# Patient Record
Sex: MALE | Race: WHITE | NOT HISPANIC OR LATINO | Employment: FULL TIME | URBAN - NONMETROPOLITAN AREA
[De-identification: names, ages, dates, MRNs, and addresses within clinical notes are randomized per-mention and may not be internally consistent; named-entity substitution may affect disease eponyms.]

---

## 2023-01-11 NOTE — PROGRESS NOTES
"Subjective    Mr. Agarwal is 37 y.o. male    Chief Complaint: Vasectomy Consult    History of Present Illness  37-year-old male new patient requesting vasectomy for permanent sterilization.  He denies scrotal pain. He has no children but states both he and his wife desire no children.        The following portions of the patient's history were reviewed and updated as appropriate: allergies, current medications, past family history, past medical history, past social history, past surgical history and problem list.    Review of Systems      Current Outpatient Medications:   •  finasteride (PROSCAR) 5 MG tablet, Take 5 mg by mouth., Disp: , Rfl:   •  zolpidem CR (AMBIEN CR) 12.5 MG CR tablet, Take 12.5 mg by mouth At Night As Needed., Disp: , Rfl:     History reviewed. No pertinent past medical history.    History reviewed. No pertinent surgical history.    Social History     Socioeconomic History   • Marital status:    Tobacco Use   • Smoking status: Never   • Smokeless tobacco: Never   Vaping Use   • Vaping Use: Never used   Substance and Sexual Activity   • Alcohol use: Never   • Drug use: Never   • Sexual activity: Defer       Family History   Problem Relation Age of Onset   • No Known Problems Father    • No Known Problems Mother        Objective    Temp 97.4 °F (36.3 °C)   Ht 185.4 cm (73\")   Wt 108 kg (237 lb 12.8 oz)   BMI 31.37 kg/m²     Physical Exam  Penis and testicles normal. Vasa palpable bilaterally.       No results found for this or any previous visit.  Assessment and Plan    Diagnoses and all orders for this visit:    1. Encounter for other contraceptive management (Primary)  -     Vasectomy; Future      We spent time today discussing the permanent irreversible elective nature of vasectomy including the risks, benefits, and alternatives.  We discussed risk for infection, bleeding, need for additional procedures, loss of testicle, chronic pain, failure procedure, need to continue back of her " current birth control method until sterility is confirmed.  He voiced understanding and provided informed consent to proceed in the clinic in the next few weeks.       This document has been signed by JARED Fonseca MD on January 14, 2023 13:22 CST

## 2023-01-13 ENCOUNTER — OFFICE VISIT (OUTPATIENT)
Dept: UROLOGY | Facility: CLINIC | Age: 38
End: 2023-01-13
Payer: COMMERCIAL

## 2023-01-13 VITALS — TEMPERATURE: 97.4 F | HEIGHT: 73 IN | BODY MASS INDEX: 31.51 KG/M2 | WEIGHT: 237.8 LBS

## 2023-01-13 DIAGNOSIS — Z30.8 ENCOUNTER FOR OTHER CONTRACEPTIVE MANAGEMENT: Primary | ICD-10-CM

## 2023-01-13 PROCEDURE — 99203 OFFICE O/P NEW LOW 30 MIN: CPT | Performed by: UROLOGY

## 2023-01-13 RX ORDER — ZOLPIDEM TARTRATE 12.5 MG/1
12.5 TABLET, FILM COATED, EXTENDED RELEASE ORAL NIGHTLY PRN
COMMUNITY
Start: 2022-12-21

## 2023-01-13 RX ORDER — FINASTERIDE 5 MG/1
5 TABLET, FILM COATED ORAL
COMMUNITY

## 2023-01-16 ENCOUNTER — TELEPHONE (OUTPATIENT)
Dept: UROLOGY | Facility: CLINIC | Age: 38
End: 2023-01-16
Payer: COMMERCIAL

## 2023-01-16 NOTE — TELEPHONE ENCOUNTER
Pt called to change providers for his vasectomy- Dr. Fonseca did approve, Hub is scheduling with another provider.

## 2023-01-17 NOTE — PROGRESS NOTES
Subjective    Mr. Agarwal is 37 y.o. male    Chief Complaint: Encounter for Vasectomy Counciling    History of Present Illness  Patient here for vasectomy consult.  Patient  with zero children.  No scrotal trauma/surgery.     The following portions of the patient's history were reviewed and updated as appropriate: allergies, current medications, past family history, past medical history, past social history, past surgical history and problem list.    Review of Systems      Current Outpatient Medications:   •  finasteride (PROSCAR) 5 MG tablet, Take 5 mg by mouth., Disp: , Rfl:   •  zolpidem CR (AMBIEN CR) 12.5 MG CR tablet, Take 12.5 mg by mouth At Night As Needed., Disp: , Rfl:   •  ALPRAZolam (Xanax) 2 MG tablet, Take 1 tablet by mouth At Night As Needed for Anxiety. Take 30 minutes prior to procedure, Disp: 1 tablet, Rfl: 0  •  HYDROcodone-acetaminophen (Norco) 5-325 MG per tablet, Take 1 tablet by mouth Every 6 (Six) Hours As Needed for Severe Pain., Disp: 8 tablet, Rfl: 0    History reviewed. No pertinent past medical history.    History reviewed. No pertinent surgical history.    Social History     Socioeconomic History   • Marital status:    Tobacco Use   • Smoking status: Never   • Smokeless tobacco: Never   Vaping Use   • Vaping Use: Never used   Substance and Sexual Activity   • Alcohol use: Never   • Drug use: Never   • Sexual activity: Defer       Family History   Problem Relation Age of Onset   • No Known Problems Father    • No Known Problems Mother        Objective    There were no vitals taken for this visit.    Physical Exam  Genitourinary:     Penis: Normal.       Testes: Normal.      Comments: Vas palpable bilaterally            No results found for this or any previous visit.  Assessment and Plan    Diagnoses and all orders for this visit:    1. Encounter for vasectomy counseling (Primary)  -     HYDROcodone-acetaminophen (Norco) 5-325 MG per tablet; Take 1 tablet by mouth Every 6  (Six) Hours As Needed for Severe Pain.  Dispense: 8 tablet; Refill: 0  -     ALPRAZolam (Xanax) 2 MG tablet; Take 1 tablet by mouth At Night As Needed for Anxiety. Take 30 minutes prior to procedure  Dispense: 1 tablet; Refill: 0          He was given the consent form, pre-vasectomy instruction sheet, and vasectomy booklet. I extensively reviewed with him the likely postoperative recuperative period as well as the need to continue to use contraception until he is notified by us of his sterility. He will have a semen analysis after 20-30 ejaculations. He understands the potential side effects of local anesthesia, bleeding, scrotal hematoma, wound infection, epididymal orchitis, epididymal congestion,  1% risk chronic testicular pain potentially requiring further surgery, sperm granuloma, antisperm antibodies, early recanalization, spontaneous recanalization with pregnancy after demonstration of azoospermia risk of 1 in 2000 and the possible association with prostate cancer. He is aware of alternatives to vasectomy. He has given this careful consideration and wishes to proceed with a vasectomy.

## 2023-01-18 ENCOUNTER — TELEPHONE (OUTPATIENT)
Dept: UROLOGY | Facility: CLINIC | Age: 38
End: 2023-01-18
Payer: COMMERCIAL

## 2023-01-18 NOTE — TELEPHONE ENCOUNTER
Caller: [facility]        Best call back number: [phone]    What form or medical record are you requesting: PROG NOTE    Who is requesting this form or medical record from you: REF FACILITY    How would you like to receive the form or medical records (pick-up, mail, fax): FAX  If fax, what is the fax number: [phone]    Timeframe paperwork needed: ASAP    Additional notes: REF [facility] REQUIRES PROG NOTE FROM 1/13/23 VISIT WITH DR THORPE PLEASE

## 2023-01-23 ENCOUNTER — OFFICE VISIT (OUTPATIENT)
Dept: UROLOGY | Facility: CLINIC | Age: 38
End: 2023-01-23
Payer: COMMERCIAL

## 2023-01-23 DIAGNOSIS — Z30.09 ENCOUNTER FOR VASECTOMY COUNSELING: Primary | ICD-10-CM

## 2023-01-23 PROCEDURE — 99213 OFFICE O/P EST LOW 20 MIN: CPT | Performed by: UROLOGY

## 2023-01-23 RX ORDER — HYDROCODONE BITARTRATE AND ACETAMINOPHEN 5; 325 MG/1; MG/1
1 TABLET ORAL EVERY 6 HOURS PRN
Qty: 8 TABLET | Refills: 0 | Status: SHIPPED | OUTPATIENT
Start: 2023-01-23

## 2023-01-23 RX ORDER — ALPRAZOLAM 2 MG/1
2 TABLET ORAL NIGHTLY PRN
Qty: 1 TABLET | Refills: 0 | Status: SHIPPED | OUTPATIENT
Start: 2023-01-23

## 2023-03-31 ENCOUNTER — TELEPHONE (OUTPATIENT)
Dept: UROLOGY | Facility: CLINIC | Age: 38
End: 2023-03-31
Payer: COMMERCIAL

## 2023-03-31 DIAGNOSIS — Z30.09 ENCOUNTER FOR VASECTOMY COUNSELING: Primary | ICD-10-CM

## 2023-03-31 RX ORDER — HYDROCODONE BITARTRATE AND ACETAMINOPHEN 5; 325 MG/1; MG/1
1 TABLET ORAL EVERY 6 HOURS PRN
Qty: 8 TABLET | Refills: 0 | Status: SHIPPED | OUTPATIENT
Start: 2023-03-31

## 2023-03-31 NOTE — TELEPHONE ENCOUNTER
Called pharmacy and spoke with them about the pain meds.  They are filling them for the pt and I called and let the pt knwo.

## 2023-04-14 ENCOUNTER — PROCEDURE VISIT (OUTPATIENT)
Dept: UROLOGY | Facility: CLINIC | Age: 38
End: 2023-04-14
Payer: COMMERCIAL

## 2023-04-14 DIAGNOSIS — Z30.2 ENCOUNTER FOR VASECTOMY: ICD-10-CM

## 2023-04-14 PROCEDURE — 55250 REMOVAL OF SPERM DUCT(S): CPT | Performed by: UROLOGY

## 2023-04-14 NOTE — PROGRESS NOTES
No Scalpel Vasectomy Procedure Note    Indications: 37 y.o. male desiring permanent sterilization    Pre-operative Diagnosis: Undesired fertility    Post-operative Diagnosis: Undesired fertility    Anesthesia: Lidocaine 1% without epinephrine     Procedure Details     The risks and benefits of the procedure were discussed at the pre-procedure consultation, and written, informed consent obtained.    Premedicated with Norco 5 and Xanax 2 mg 30 minutes prior to procedure.    The scrotum was palpated with both testes normal in size and position, no masses palpated. The scrotum was cleansed with warm Betadine and draped in the usual sterile manner.     A vasal sheath block was performed on both the left and right vas.  After adequate anesthesia was established, a small perforation was made in the skin and the right vas was isolated with the ring forceps, dissected free and delivered through the skin perforation.  The right vas was divided, approximately 3 cm portion removed, and each end of the vas was cauterized.  The ends of the vas were replaced in the scrotum through the puncture site.  The left vas was then isolated, divided, cauterized in a similar fashion.  Midportions removed not sent to pathology to confirm because they were grossly normal.     Any bleeding was controlled with electrocautery.  3.0 Chormic interrupted suture was used to close both sites. The puncture site was dry when the procedure was completed. Dressing was applied to both incisions and jock strap placed for scrotal support.    Specimen: None    Condition: Stable    Complications: None    Plan:  1. Continue contraception until negative sperm analysis. Bring 2 semen samples after 20-30 ejaculates  2. Warning signs of infection were reviewed.   3. Patient is taken home by significant other with written home care instructions.  • Bedrest X 48 hrs, Ice pack every 3 hours for 24 hrs.    • Call the clinic if excessive pain, bleeding or swelling.

## 2023-06-16 ENCOUNTER — TELEPHONE (OUTPATIENT)
Dept: UROLOGY | Facility: CLINIC | Age: 38
End: 2023-06-16

## 2023-06-16 NOTE — TELEPHONE ENCOUNTER
Provider: DR MONTEJO  Caller: STAR WERNER  Relationship to Patient: SELF  Reason for Call: PT WOULD LIKE TO GET PSA TESTED DUE TO FAMILY HISTORY.  FATHER HAS HAD PROSTATE CANCER, AND BROTHER RECENTLY TESTED FOR HIGH PSA.    PLEASE CALL PT TO DISCUSS AND PLACE ORDERS AS NEEDED.

## 2023-07-24 ENCOUNTER — OFFICE VISIT (OUTPATIENT)
Dept: PRIMARY CARE CLINIC | Age: 38
End: 2023-07-24
Payer: COMMERCIAL

## 2023-07-24 VITALS
BODY MASS INDEX: 31.46 KG/M2 | DIASTOLIC BLOOD PRESSURE: 84 MMHG | OXYGEN SATURATION: 98 % | WEIGHT: 237.4 LBS | TEMPERATURE: 97.5 F | HEART RATE: 94 BPM | HEIGHT: 73 IN | SYSTOLIC BLOOD PRESSURE: 124 MMHG

## 2023-07-24 DIAGNOSIS — Z01.89 ROUTINE LAB DRAW: ICD-10-CM

## 2023-07-24 DIAGNOSIS — R07.89 ATYPICAL CHEST PAIN: Primary | ICD-10-CM

## 2023-07-24 DIAGNOSIS — K21.9 GASTROESOPHAGEAL REFLUX DISEASE WITHOUT ESOPHAGITIS: ICD-10-CM

## 2023-07-24 DIAGNOSIS — Z12.5 SCREENING FOR PROSTATE CANCER: ICD-10-CM

## 2023-07-24 DIAGNOSIS — Z80.42 FAMILY HX OF PROSTATE CANCER: ICD-10-CM

## 2023-07-24 LAB
ALBUMIN SERPL-MCNC: 5.4 G/DL (ref 3.5–5.2)
ALP SERPL-CCNC: 78 U/L (ref 40–130)
ALT SERPL-CCNC: 33 U/L (ref 5–41)
ANION GAP SERPL CALCULATED.3IONS-SCNC: 13 MMOL/L (ref 7–19)
AST SERPL-CCNC: 33 U/L (ref 5–40)
BASOPHILS # BLD: 0.1 K/UL (ref 0–0.2)
BASOPHILS NFR BLD: 1 % (ref 0–1)
BILIRUB SERPL-MCNC: 1.7 MG/DL (ref 0.2–1.2)
BUN SERPL-MCNC: 12 MG/DL (ref 6–20)
CALCIUM SERPL-MCNC: 9.9 MG/DL (ref 8.6–10)
CHLORIDE SERPL-SCNC: 103 MMOL/L (ref 98–111)
CHOLEST SERPL-MCNC: 174 MG/DL (ref 160–199)
CO2 SERPL-SCNC: 26 MMOL/L (ref 22–29)
CREAT SERPL-MCNC: 1.1 MG/DL (ref 0.5–1.2)
EOSINOPHIL # BLD: 0.2 K/UL (ref 0–0.6)
EOSINOPHIL NFR BLD: 3.5 % (ref 0–5)
ERYTHROCYTE [DISTWIDTH] IN BLOOD BY AUTOMATED COUNT: 12.6 % (ref 11.5–14.5)
GLUCOSE SERPL-MCNC: 90 MG/DL (ref 74–109)
HCT VFR BLD AUTO: 47.8 % (ref 42–52)
HDLC SERPL-MCNC: 43 MG/DL (ref 55–121)
HGB BLD-MCNC: 16.7 G/DL (ref 14–18)
IMM GRANULOCYTES # BLD: 0.1 K/UL
LDLC SERPL CALC-MCNC: 105 MG/DL
LYMPHOCYTES # BLD: 1.6 K/UL (ref 1.1–4.5)
LYMPHOCYTES NFR BLD: 32 % (ref 20–40)
MCH RBC QN AUTO: 30.1 PG (ref 27–31)
MCHC RBC AUTO-ENTMCNC: 34.9 G/DL (ref 33–37)
MCV RBC AUTO: 86.3 FL (ref 80–94)
MONOCYTES # BLD: 0.4 K/UL (ref 0–0.9)
MONOCYTES NFR BLD: 8.4 % (ref 0–10)
NEUTROPHILS # BLD: 2.7 K/UL (ref 1.5–7.5)
NEUTS SEG NFR BLD: 54.1 % (ref 50–65)
PLATELET # BLD AUTO: 214 K/UL (ref 130–400)
PMV BLD AUTO: 10.8 FL (ref 9.4–12.4)
POTASSIUM SERPL-SCNC: 3.9 MMOL/L (ref 3.5–5)
PROT SERPL-MCNC: 7.5 G/DL (ref 6.6–8.7)
PSA SERPL-MCNC: 0.76 NG/ML (ref 0–4)
RBC # BLD AUTO: 5.54 M/UL (ref 4.7–6.1)
SODIUM SERPL-SCNC: 142 MMOL/L (ref 136–145)
TRIGL SERPL-MCNC: 132 MG/DL (ref 0–149)
WBC # BLD AUTO: 4.9 K/UL (ref 4.8–10.8)

## 2023-07-24 PROCEDURE — 99213 OFFICE O/P EST LOW 20 MIN: CPT | Performed by: FAMILY MEDICINE

## 2023-07-24 NOTE — PROGRESS NOTES
[facility]  [facility],[address]  [address] [address]  Dept: [phone]  Dept Fax: [phone]  Loc: [phone]      Subjective:     Chief Complaint   Patient presents with    Chest Pain       HPI:  Barbara Zamora is a 41 y.o. male presents today for  evaluation of atypical chest pain  that came on after eating fast food at [employer]. CP was located in the lower sternal area. Pain is non radiating. Pain lasted 2 hours and relieved after taking Peptobismol  and an OTC antacid as well as after burping. No prior episode of this kind of chest pain before. He also states that he has been working out quite a lot (bench press)  Pt is concerned because of + family hx of heart disease  BP is stable. Most recent lipid panel is normal.he is not a smoker. Weight is normal. He is active. ROS:   Review of Systems   Cardiovascular:  Positive for chest pain. All other systems reviewed and are negative. Chest discomfort as described in HPI      PMHx:  Past Medical History:   Diagnosis Date    Anxiety     patient takes clonazepam as needed     There is no problem list on file for this patient. PSHx:  Past Surgical History:   Procedure Laterality Date    EAR SURGERY Bilateral 1996    had ears pinned back    TONSILLECTOMY  2008    WISDOM TOOTH EXTRACTION  1998       PFHx:  Family History   Problem Relation Age of Onset    No Known Problems Mother     Prostate Cancer Father        SocialHx:  Social History     Tobacco Use    Smoking status: Never    Smokeless tobacco: Never   Substance Use Topics    Alcohol use:  Yes     Alcohol/week: 3.0 standard drinks     Types: 3 Cans of beer per week       Allergies:  No Known Allergies    Medications:  Current Outpatient Medications   Medication Sig Dispense Refill    fluticasone (FLONASE) 50 MCG/ACT nasal spray 1 spray by Each Nostril route daily 16 g 0    zolpidem (AMBIEN CR) 12.5 MG extended release tablet Take 1 tablet by

## 2023-08-03 ENCOUNTER — TELEPHONE (OUTPATIENT)
Dept: PRIMARY CARE CLINIC | Age: 38
End: 2023-08-03

## 2023-08-03 NOTE — TELEPHONE ENCOUNTER
----- Message from Karla Valdez MD sent at 8/1/2023  5:12 PM CDT -----  Pt has appt in 2 weeks - will discuss results with him then

## 2023-08-03 NOTE — TELEPHONE ENCOUNTER
Patient has scheduled appointment for 08- and provider will discuss results with patient during appointment.

## 2023-08-04 DIAGNOSIS — G47.00 INSOMNIA, UNSPECIFIED TYPE: Primary | ICD-10-CM

## 2023-08-07 RX ORDER — ZOLPIDEM TARTRATE 12.5 MG/1
12.5 TABLET, FILM COATED, EXTENDED RELEASE ORAL NIGHTLY PRN
Qty: 30 TABLET | Refills: 0 | Status: SHIPPED | OUTPATIENT
Start: 2023-08-07 | End: 2023-09-06

## 2023-08-15 ENCOUNTER — OFFICE VISIT (OUTPATIENT)
Dept: PRIMARY CARE CLINIC | Age: 38
End: 2023-08-15
Payer: COMMERCIAL

## 2023-08-15 VITALS
HEIGHT: 73 IN | WEIGHT: 239 LBS | TEMPERATURE: 99 F | SYSTOLIC BLOOD PRESSURE: 124 MMHG | HEART RATE: 93 BPM | BODY MASS INDEX: 31.68 KG/M2 | DIASTOLIC BLOOD PRESSURE: 72 MMHG | OXYGEN SATURATION: 97 %

## 2023-08-15 DIAGNOSIS — J02.9 SORE THROAT: ICD-10-CM

## 2023-08-15 DIAGNOSIS — Z00.00 ENCOUNTER FOR ROUTINE HISTORY AND PHYSICAL EXAMINATION OF ADULT: Primary | ICD-10-CM

## 2023-08-15 DIAGNOSIS — J00 ACUTE NASOPHARYNGITIS: ICD-10-CM

## 2023-08-15 PROCEDURE — 99395 PREV VISIT EST AGE 18-39: CPT | Performed by: FAMILY MEDICINE

## 2023-08-15 RX ORDER — AZITHROMYCIN 250 MG/1
TABLET, FILM COATED ORAL
Qty: 6 TABLET | Refills: 0 | Status: SHIPPED | OUTPATIENT
Start: 2023-08-15

## 2023-08-15 ASSESSMENT — ENCOUNTER SYMPTOMS
FACIAL SWELLING: 0
SINUS PAIN: 0
ALLERGIC/IMMUNOLOGIC NEGATIVE: 1
GASTROINTESTINAL NEGATIVE: 1
SINUS PRESSURE: 0
SORE THROAT: 1
RESPIRATORY NEGATIVE: 1

## 2023-08-15 NOTE — PROGRESS NOTES
Well Adult Note  Name: Devaughn Sep Date: 8/15/2023   MRN: [id number] Sex: Male   Age: 41 y.o. Ethnicity: Non- / Non    : 1985 Race: White (non-)      Oralia Hashimoto is here for well adult exam.  History:  He presents today for his annual physical and routine preventative visit. PMHx reviewed. No significant change. Family and social history also reviewed. No recent ER or UC visit. No recent hospitalization. Chronic medications reviewed, updated and reconciled  Pt does not smoke, drink ETOH very rarely. He does not use recreational drugs. He is up to date on his immunizations. He is due for screening preventative labs   Today he c/o sore throat. He states that when he woke up this am, his voice was really deep. No cough. No fever. No known ill contacts    Review of Systems   HENT:  Positive for congestion (mild), postnasal drip and sore throat. Negative for ear pain, facial swelling, hearing loss, sinus pressure, sinus pain and sneezing. Respiratory: Negative. Gastrointestinal: Negative. Genitourinary: Negative. Musculoskeletal: Negative. Skin: Negative. Allergic/Immunologic: Negative. Neurological: Negative. Hematological: Negative. Psychiatric/Behavioral: Negative. All other systems reviewed and are negative. No Known Allergies      Prior to Visit Medications    Medication Sig Taking? Authorizing Provider   azithromycin (ZITHROMAX) 250 MG tablet Take 2 tablet today and then 1 tablet po daily x 4 days Yes Olivia Jones MD   zolpidem (AMBIEN CR) 12.5 MG extended release tablet Take 1 tablet by mouth nightly as needed for Sleep for up to 30 days. Max Daily Amount: 12.5 mg Yes Olivia Jones MD   fluticasone (FLONASE) 50 MCG/ACT nasal spray 1 spray by Each Nostril route daily Yes Olivia Jones MD   clonazePAM (KLONOPIN) 2 MG tablet Take 1 tablet by mouth as needed for Anxiety for up to 30 days.  Yes Asmita Amezcua MD

## 2023-08-15 NOTE — PATIENT INSTRUCTIONS
better and can speak for yourself again, you can accept or refuse any treatment. It doesn't matter what you said in your living will. Some states may limit your right to refuse treatment in certain cases. For example, you may need to clearly state in your living will that you don't want artificial hydration and nutrition, such as being fed through a tube. Is a living will a legal document? A living will is a legal document. Each state has its own laws about living michael. And a living will may be called something else in your state. Here are some things to know about living michael. You don't need an  to complete a living will. But legal advice can be helpful if your state's laws are unclear. It can also help if your health history is complicated or your family can't agree on what should be in your living will. You can change your living will at any time. Some people find that their wishes about end-of-life care change as their health changes. If you make big changes to your living will, complete a new form. If you move to another state, make sure that your living will is legal in the state where you now live. In most cases, doctors will respect your wishes even if you have a form from a different state. You might use a universal form that has been approved by many states. This kind of form can sometimes be filled out and stored online. Your digital copy will then be available wherever you have a connection to the internet. The doctors and nurses who need to treat you can find it right away. Your state may offer an online registry. This is another place where you can store your living will online. It's a good idea to get your living will notarized. This means using a person called a  to watch two people sign, or witness, your living will. What should you know when you create a living will? Here are some questions to ask yourself as you make your living will.   Do you know enough about

## 2023-08-17 ENCOUNTER — TELEPHONE (OUTPATIENT)
Dept: PRIMARY CARE CLINIC | Age: 38
End: 2023-08-17

## 2023-08-17 NOTE — TELEPHONE ENCOUNTER
Ariana Robin called wanted to let Dr. Mi Gonzalez and staff know that he was in the office on 8/15/23 with a sore throat and he has tested positive for COVID. He is asking if he can be prescribed Paxlovid?

## 2023-08-17 NOTE — TELEPHONE ENCOUNTER
Paxlovid  is only indicate  in moderate to severe cases of Covid.    He does not meet criteria for the medication

## 2023-09-12 DIAGNOSIS — G47.00 INSOMNIA, UNSPECIFIED TYPE: ICD-10-CM

## 2023-09-12 RX ORDER — ZOLPIDEM TARTRATE 12.5 MG/1
TABLET, FILM COATED, EXTENDED RELEASE ORAL
Qty: 30 TABLET | Refills: 0 | Status: SHIPPED | OUTPATIENT
Start: 2023-09-12 | End: 2023-10-12

## 2023-10-16 DIAGNOSIS — G47.00 INSOMNIA, UNSPECIFIED TYPE: ICD-10-CM

## 2023-10-16 RX ORDER — ZOLPIDEM TARTRATE 12.5 MG/1
TABLET, FILM COATED, EXTENDED RELEASE ORAL
Qty: 30 TABLET | Refills: 0 | Status: SHIPPED | OUTPATIENT
Start: 2023-10-16 | End: 2023-11-15

## 2023-10-16 NOTE — TELEPHONE ENCOUNTER
Alana Lopez called to request a refill on his medication. Last office visit : 8/15/2023   Next office visit : 2/15/2024     Last UDS:   Amphetamine Screen, Urine   Date Value Ref Range Status   02/23/2023 Negative  Final     Barbiturate Screen, Urine   Date Value Ref Range Status   02/23/2023 Negative  Final     Benzodiazepine Screen, Urine   Date Value Ref Range Status   02/23/2023 Negative  Final     Buprenorphine Urine   Date Value Ref Range Status   02/23/2023 Negative  Final     Cocaine Metabolite Screen, Urine   Date Value Ref Range Status   02/23/2023 Negative  Final     Gabapentin Screen, Urine   Date Value Ref Range Status   02/23/2023 Negative  Final     MDMA, Urine   Date Value Ref Range Status   02/23/2023 Negative  Final     Methamphetamine, Urine   Date Value Ref Range Status   02/23/2023 Negative  Final     Opiate Scrn, Ur   Date Value Ref Range Status   02/23/2023 Negative  Final     Oxycodone Screen, Ur   Date Value Ref Range Status   02/23/2023 Negative  Final     PCP Screen, Urine   Date Value Ref Range Status   02/23/2023 Negative  Final     Propoxyphene Screen, Urine   Date Value Ref Range Status   02/23/2023 Negative  Final     THC Screen, Urine   Date Value Ref Range Status   02/23/2023 Negative  Final     Tricyclic Antidepressants, Urine   Date Value Ref Range Status   02/23/2023 Negative  Final       Last Eudelia Harp: 8/7/23  Medication Contract: 2/23/23   Last Fill: 9/12/23    Requested Prescriptions     Pending Prescriptions Disp Refills    zolpidem (AMBIEN CR) 12.5 MG extended release tablet [Pharmacy Med Name: Zolpidem Tartrate ER 12.5 MG Oral Tablet Extended Release] 30 tablet 0     Sig: TAKE 1 TABLET BY MOUTH ONCE DAILY AT NIGHT AS NEEDED FOR SLEEP         Please approve or refuse this medication.    Jordan Hightower LPN

## 2023-11-03 ENCOUNTER — OFFICE VISIT (OUTPATIENT)
Dept: PRIMARY CARE CLINIC | Age: 38
End: 2023-11-03
Payer: COMMERCIAL

## 2023-11-03 VITALS
BODY MASS INDEX: 31.54 KG/M2 | HEART RATE: 73 BPM | WEIGHT: 238 LBS | HEIGHT: 73 IN | SYSTOLIC BLOOD PRESSURE: 120 MMHG | TEMPERATURE: 97.6 F | DIASTOLIC BLOOD PRESSURE: 75 MMHG

## 2023-11-03 DIAGNOSIS — J02.9 SORE THROAT: Primary | ICD-10-CM

## 2023-11-03 LAB — S PYO AG THROAT QL: NORMAL

## 2023-11-03 PROCEDURE — 99213 OFFICE O/P EST LOW 20 MIN: CPT | Performed by: NURSE PRACTITIONER

## 2023-11-03 ASSESSMENT — ENCOUNTER SYMPTOMS
VOMITING: 0
COLOR CHANGE: 0
DIARRHEA: 0
COUGH: 1
SHORTNESS OF BREATH: 0
SORE THROAT: 1
NAUSEA: 0
CHEST TIGHTNESS: 0
ABDOMINAL PAIN: 0

## 2023-11-03 NOTE — PROGRESS NOTES
11/3/2023     Deanna Nagel (:  1985) is a 41 y.o. male,Established patient, here for evaluation of the following chief complaint(s):  Cough (Sore and itchy throat. Pt tends to get strep often. Warm tea and salt water gargle helps. )      ASSESSMENT/PLAN:  1. Sore throat  Assessment & Plan:   Patient here today with concerns of a 'scratchy\" sore throat that began yesterday. He denies any fever, but does note occasional cough. His wife tested positive for strep earlier this week. His throat is slightly erythematous without any noted edema. In office strep was negative. Encouraged increased hydration and rest, along with over the counter symptomatic treatment. Patient to call back or return to clinic with any worsening symptoms or if not improved. Orders:  -     POCT rapid strep A      Return if symptoms worsen or fail to improve. SUBJECTIVE/OBJECTIVE:  Cough  Associated symptoms include coughing and a sore throat. Pertinent negatives include no abdominal pain, arthralgias, chest pain, congestion, fever, myalgias, nausea, numbness, vomiting or weakness. Prior to Visit Medications    Medication Sig Taking? Authorizing Provider   zolpidem (AMBIEN CR) 12.5 MG extended release tablet TAKE 1 TABLET BY MOUTH ONCE DAILY AT NIGHT AS NEEDED FOR SLEEP Yes KATHIE Hills CNP   fluticasone (FLONASE) 50 MCG/ACT nasal spray 1 spray by Each Nostril route daily Yes Melissa Ding MD   finasteride (PROSCAR) 5 MG tablet Take 1 tablet by mouth daily Yes Provider, MD Allison   azithromycin (ZITHROMAX) 250 MG tablet Take 2 tablet today and then 1 tablet po daily x 4 days  Patient not taking: Reported on 11/3/2023  Melissa Ding MD   clonazePAM (KLONOPIN) 2 MG tablet Take 1 tablet by mouth as needed for Anxiety for up to 30 days. Joshua Adam MD       Review of Systems   Constitutional:  Negative for activity change and fever. HENT:  Positive for sore throat.  Negative for congestion ronak connelly

## 2023-11-03 NOTE — ASSESSMENT & PLAN NOTE
Patient here today with concerns of a 'scratchy\" sore throat that began yesterday. He denies any fever, but does note occasional cough. His wife tested positive for strep earlier this week. His throat is slightly erythematous without any noted edema. In office strep was negative. Encouraged increased hydration and rest, along with over the counter symptomatic treatment. Patient to call back or return to clinic with any worsening symptoms or if not improved.
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
## 2023-11-16 ENCOUNTER — ANESTHESIA EVENT (OUTPATIENT)
Dept: OPERATING ROOM | Age: 38
End: 2023-11-16

## 2023-11-20 ENCOUNTER — HOSPITAL ENCOUNTER (OUTPATIENT)
Age: 38
Setting detail: OUTPATIENT SURGERY
Discharge: HOME | End: 2023-11-20
Attending: INTERNAL MEDICINE | Admitting: INTERNAL MEDICINE

## 2023-11-20 ENCOUNTER — APPOINTMENT (OUTPATIENT)
Dept: OPERATING ROOM | Age: 38
End: 2023-11-20
Attending: INTERNAL MEDICINE

## 2023-11-20 ENCOUNTER — ANESTHESIA (OUTPATIENT)
Dept: OPERATING ROOM | Age: 38
End: 2023-11-20

## 2023-11-20 ENCOUNTER — HOSPITAL ENCOUNTER (OUTPATIENT)
Age: 38
Setting detail: SPECIMEN
Discharge: HOME | End: 2023-11-20
Payer: COMMERCIAL

## 2023-11-20 VITALS
WEIGHT: 235 LBS | SYSTOLIC BLOOD PRESSURE: 114 MMHG | HEIGHT: 73 IN | HEART RATE: 71 BPM | BODY MASS INDEX: 31.14 KG/M2 | RESPIRATION RATE: 16 BRPM | TEMPERATURE: 97.2 F | DIASTOLIC BLOOD PRESSURE: 67 MMHG | OXYGEN SATURATION: 96 %

## 2023-11-20 DIAGNOSIS — Z80.9 FAMILY HISTORY OF CANCER: Primary | ICD-10-CM

## 2023-11-20 PROCEDURE — 45385 COLONOSCOPY W/LESION REMOVAL: CPT

## 2023-11-20 PROCEDURE — 88305 TISSUE EXAM BY PATHOLOGIST: CPT

## 2023-11-20 RX ORDER — PROPOFOL 10 MG/ML
INJECTION, EMULSION INTRAVENOUS PRN
Status: DISCONTINUED | OUTPATIENT
Start: 2023-11-20 | End: 2023-11-20 | Stop reason: SDUPTHER

## 2023-11-20 RX ORDER — LIDOCAINE HYDROCHLORIDE 10 MG/ML
INJECTION, SOLUTION EPIDURAL; INFILTRATION; INTRACAUDAL; PERINEURAL PRN
Status: DISCONTINUED | OUTPATIENT
Start: 2023-11-20 | End: 2023-11-20 | Stop reason: SDUPTHER

## 2023-11-20 RX ORDER — SODIUM CHLORIDE, SODIUM LACTATE, POTASSIUM CHLORIDE, CALCIUM CHLORIDE 600; 310; 30; 20 MG/100ML; MG/100ML; MG/100ML; MG/100ML
INJECTION, SOLUTION INTRAVENOUS CONTINUOUS
Status: DISCONTINUED | OUTPATIENT
Start: 2023-11-20 | End: 2023-11-20 | Stop reason: HOSPADM

## 2023-11-20 RX ADMIN — SODIUM CHLORIDE, SODIUM LACTATE, POTASSIUM CHLORIDE, CALCIUM CHLORIDE: 600; 310; 30; 20 INJECTION, SOLUTION INTRAVENOUS at 10:31

## 2023-11-20 RX ADMIN — LIDOCAINE HYDROCHLORIDE 30 MG: 10 INJECTION, SOLUTION EPIDURAL; INFILTRATION; INTRACAUDAL; PERINEURAL at 11:57

## 2023-11-20 RX ADMIN — PROPOFOL 300 MG: 10 INJECTION, EMULSION INTRAVENOUS at 11:57

## 2023-11-20 ASSESSMENT — PAIN - FUNCTIONAL ASSESSMENT: PAIN_FUNCTIONAL_ASSESSMENT: NONE - DENIES PAIN

## 2023-11-20 NOTE — H&P
Patient Name: Emily Erickson  : 1985  MRN: [id number]  DATE: 23    Allergies: No Known Allergies     ENDOSCOPY  History and Physical    Procedure:    [] Diagnostic Colonoscopy       [x] Screening Colonoscopy  [] EGD      [] ERCP      [] EUS       [] Other    [x] Previous office notes/History and Physical reviewed from the patients chart. Please see EMR for further details of HPI. I have examined the patient's status immediately prior to the procedure and:      Indications/HPI:       [x] Screening              [] History of Polyps      []Fhx of colon CA/polyps []+Cologard/DNA/Stool Testing      Anesthesia:   [x] MAC [] Moderate Sedation   [] General   [] None     ROS: 12 pt review of systems was negative unless stated above    Medications:   Prior to Admission medications    Medication Sig Start Date End Date Taking? Authorizing Provider   fluticasone (FLONASE) 50 MCG/ACT nasal spray 1 spray by Each Nostril route daily  Patient not taking: Reported on 2023   Melissa Ding MD   clonazePAM (KLONOPIN) 2 MG tablet Take 1 tablet by mouth as needed for Anxiety for up to 30 days. 3/29/23 8/15/23  Royal De Paz MD   finasteride (PROSCAR) 5 MG tablet Take 1 tablet by mouth daily    Provider, MD Allison       Past Medical History:  Past Medical History:   Diagnosis Date    Anxiety     patient takes clonazepam as needed       Past Surgical History:  Past Surgical History:   Procedure Laterality Date    EAR SURGERY Bilateral     had ears pinned back    TONSILLECTOMY      [facility]         Social History:  Social History     Tobacco Use    Smoking status: Never    Smokeless tobacco: Never   Vaping Use    Vaping Use: Never used   Substance Use Topics    Alcohol use:  Yes     Alcohol/week: 3.0 standard drinks of alcohol     Types: 3 Cans of beer per week    Drug use: Never       Vital Signs:   Vitals:    23 1024   BP: 122/80   Pulse: 76   Resp: 14   Temp:

## 2023-11-20 NOTE — OP NOTE
Patient: Jamila Horowitz : 1985  Med Rec#: [id number] Acc#: [id number]   Primary Care Provider Dank Herrera MD    Date of Procedure:  2023    Endoscopist: Blanca Chavira MD    Referring Provider: Dank Herrera MD    Operation Performed: Colonoscopy with snare polypectomy    Indications: Screening    Anesthesia:  Sedation was administered by anesthesia who monitored the patient during the procedure. I met with Jamila Horowitz prior to procedure. We discussed the procedure itself, and I have discussed the risks of endoscopy (including-- but not limited to-- pain, discomfort, bleeding potentially requiring second endoscopic procedure and/or blood transfusion, organ perforation requiring operative repair, damage to organs near the colon, infection, aspiration, cardiopulmonary/allergic reaction), benefits, indications to endoscopy. Additionally, we discussed options other than colonoscopy. The patient expressed understanding. All questions answered. The patient decided to proceed with the procedure. Signed informed consent was placed on the chart. Blood Loss: minimal    Withdrawal time: > 6 min  Bowel Prep: adequate     Complications: no immediate complications    DESCRIPTION OF PROCEDURE:     A time out was performed. After written informed consent was obtained, the patient was placed in the left lateral position. The perianal area was inspected, and a digital rectal exam was performed. A rectal exam was performed: normal tone, no palpable lesions. At this point, a forward viewing Olympus colonoscope was inserted into the anus and carefully advanced to the cecum. The cecum was identified by the ileocecal valve and the appendiceal orifice. The colonoscope was then slowly withdrawn with careful inspection of the mucosa in a linear and circumferential fashion. The scope was retroflexed in the rectum.  Suction was utilized during the procedure to remove as much air as possible from the

## 2023-11-20 NOTE — DISCHARGE INSTRUCTIONS
Recommendations:  1. Repeat colonoscopy: pending pathology - 5 years  2. Await biopsy results    POST-OP ORDERS: ENDOSCOPY & COLONOSCOPY:    1. Rest today. 2. DO NOT eat or drink until wide awake; eat your usual diet today in moderate amount only. 3. DO NOT drive today. 4. Call physician if you have severe pain, vomiting, fever, rectal bleeding or black bowel movements. 5.  If a biopsy was taken or a polyp removed, you should expect to hear results in about 21 days. If you have heard nothing from your physician by then, call the office for results. 6.  Discharge home when patient awake, vitals signs stable and tolerating liquids. 7. Call with questions or concerns [phone].

## 2023-11-20 NOTE — ANESTHESIA POSTPROCEDURE EVALUATION
Department of Anesthesiology  Postprocedure Note    Patient: Erica Velez  MRN: [id number]  YOB: 1985  Date of evaluation: 11/20/2023      Procedure Summary     Date: 11/20/23 Room / Location: [facility] 02 / [address]    Anesthesia Start: 6259 Anesthesia Stop:     Procedure: [address], NOT HIGH RISK (Abdomen) Diagnosis:       Screen for colon cancer      (Screen for colon cancer [Z12.11])    Surgeons: Ale Justin MD Responsible Provider: KATHIE Bray CRNA    Anesthesia Type: general, TIVA ASA Status: 1          Anesthesia Type: No value filed.     Adalid Phase I: Adalid Score: 10    Adalid Phase II:        Anesthesia Post Evaluation    Patient location during evaluation: bedside  Patient participation: complete - patient participated  Level of consciousness: sleepy but conscious  Pain score: 0  Airway patency: patent  Nausea & Vomiting: no nausea and no vomiting  Complications: no  Cardiovascular status: blood pressure returned to baseline  Respiratory status: acceptable, room air and spontaneous ventilation  Hydration status: euvolemic  Pain management: adequate

## 2023-11-22 DIAGNOSIS — G47.00 INSOMNIA, UNSPECIFIED TYPE: ICD-10-CM

## 2023-11-22 RX ORDER — ZOLPIDEM TARTRATE 12.5 MG/1
TABLET, FILM COATED, EXTENDED RELEASE ORAL
Qty: 30 TABLET | Refills: 0 | Status: SHIPPED | OUTPATIENT
Start: 2023-11-22 | End: 2023-12-22

## 2023-11-22 NOTE — TELEPHONE ENCOUNTER
Alana Lopez called to request a refill on his medication. Last office visit : 11/3/2023   Next office visit : 2/15/2024     Last UDS:   Amphetamine Screen, Urine   Date Value Ref Range Status   02/23/2023 Negative  Final     Barbiturate Screen, Urine   Date Value Ref Range Status   02/23/2023 Negative  Final     Benzodiazepine Screen, Urine   Date Value Ref Range Status   02/23/2023 Negative  Final     Buprenorphine Urine   Date Value Ref Range Status   02/23/2023 Negative  Final     Cocaine Metabolite Screen, Urine   Date Value Ref Range Status   02/23/2023 Negative  Final     Gabapentin Screen, Urine   Date Value Ref Range Status   02/23/2023 Negative  Final     MDMA, Urine   Date Value Ref Range Status   02/23/2023 Negative  Final     Methamphetamine, Urine   Date Value Ref Range Status   02/23/2023 Negative  Final     Opiate Scrn, Ur   Date Value Ref Range Status   02/23/2023 Negative  Final     Oxycodone Screen, Ur   Date Value Ref Range Status   02/23/2023 Negative  Final     PCP Screen, Urine   Date Value Ref Range Status   02/23/2023 Negative  Final     Propoxyphene Screen, Urine   Date Value Ref Range Status   02/23/2023 Negative  Final     THC Screen, Urine   Date Value Ref Range Status   02/23/2023 Negative  Final     Tricyclic Antidepressants, Urine   Date Value Ref Range Status   02/23/2023 Negative  Final       Last Eudelia Harp: 11/22/23  Medication Contract: 2/23/23   Last Fill: 10/16/23    Requested Prescriptions     Pending Prescriptions Disp Refills    zolpidem (AMBIEN CR) 12.5 MG extended release tablet [Pharmacy Med Name: Zolpidem Tartrate ER 12.5 MG Oral Tablet Extended Release] 30 tablet 0     Sig: TAKE 1 TABLET BY MOUTH ONCE DAILY AT NIGHT AS NEEDED FOR SLEEP                   Please approve or refuse this medication.    Jordan Hightower LPN

## 2023-12-03 PROBLEM — J02.9 SORE THROAT: Status: RESOLVED | Noted: 2023-11-03 | Resolved: 2023-12-03

## 2023-12-31 DIAGNOSIS — G47.00 INSOMNIA, UNSPECIFIED TYPE: ICD-10-CM

## 2024-01-02 RX ORDER — ZOLPIDEM TARTRATE 12.5 MG/1
TABLET, FILM COATED, EXTENDED RELEASE ORAL
Qty: 30 TABLET | Refills: 0 | Status: SHIPPED | OUTPATIENT
Start: 2024-01-02 | End: 2024-02-01

## 2024-01-02 NOTE — TELEPHONE ENCOUNTER
Germán Perla called to request a refill on his medication.      Last office visit : 11/3/2023   Next office visit : 2/15/2024     Last UDS:   Amphetamine Screen, Urine   Date Value Ref Range Status   02/23/2023 Negative  Final     Barbiturate Screen, Urine   Date Value Ref Range Status   02/23/2023 Negative  Final     Benzodiazepine Screen, Urine   Date Value Ref Range Status   02/23/2023 Negative  Final     Buprenorphine Urine   Date Value Ref Range Status   02/23/2023 Negative  Final     Cocaine Metabolite Screen, Urine   Date Value Ref Range Status   02/23/2023 Negative  Final     Gabapentin Screen, Urine   Date Value Ref Range Status   02/23/2023 Negative  Final     MDMA, Urine   Date Value Ref Range Status   02/23/2023 Negative  Final     Methamphetamine, Urine   Date Value Ref Range Status   02/23/2023 Negative  Final     Opiate Scrn, Ur   Date Value Ref Range Status   02/23/2023 Negative  Final     Oxycodone Screen, Ur   Date Value Ref Range Status   02/23/2023 Negative  Final     PCP Screen, Urine   Date Value Ref Range Status   02/23/2023 Negative  Final     Propoxyphene Screen, Urine   Date Value Ref Range Status   02/23/2023 Negative  Final     THC Screen, Urine   Date Value Ref Range Status   02/23/2023 Negative  Final     Tricyclic Antidepressants, Urine   Date Value Ref Range Status   02/23/2023 Negative  Final       Last Torin: 11-  Medication Contract: 02-  Last Fill: 11-    Requested Prescriptions     Pending Prescriptions Disp Refills    zolpidem (AMBIEN CR) 12.5 MG extended release tablet [Pharmacy Med Name: Zolpidem Tartrate ER 12.5 MG Oral Tablet Extended Release] 30 tablet 0     Sig: TAKE 1 TABLET BY MOUTH ONCE DAILY AT NIGHT AS NEEDED FOR SLEEP         Please approve or refuse this medication.   Eliza Irving MA

## 2024-01-04 ENCOUNTER — OFFICE VISIT (OUTPATIENT)
Dept: PRIMARY CARE CLINIC | Age: 39
End: 2024-01-04
Payer: COMMERCIAL

## 2024-01-04 VITALS
OXYGEN SATURATION: 98 % | BODY MASS INDEX: 31.23 KG/M2 | HEIGHT: 73 IN | DIASTOLIC BLOOD PRESSURE: 80 MMHG | HEART RATE: 132 BPM | SYSTOLIC BLOOD PRESSURE: 110 MMHG | TEMPERATURE: 96.8 F | WEIGHT: 235.6 LBS

## 2024-01-04 DIAGNOSIS — R19.7 DIARRHEA, UNSPECIFIED TYPE: ICD-10-CM

## 2024-01-04 DIAGNOSIS — R11.0 NAUSEA: Primary | ICD-10-CM

## 2024-01-04 DIAGNOSIS — R52 BODY ACHES: ICD-10-CM

## 2024-01-04 LAB
INFLUENZA A ANTIGEN, POC: NEGATIVE
INFLUENZA B ANTIGEN, POC: NEGATIVE
LOT EXPIRE DATE: NORMAL
LOT KIT NUMBER: NORMAL
SARS-COV-2, POC: NORMAL
VALID INTERNAL CONTROL: NORMAL
VENDOR AND KIT NAME POC: NORMAL

## 2024-01-04 PROCEDURE — 99214 OFFICE O/P EST MOD 30 MIN: CPT | Performed by: NURSE PRACTITIONER

## 2024-01-04 RX ORDER — ONDANSETRON HYDROCHLORIDE 8 MG/1
8 TABLET, FILM COATED ORAL EVERY 8 HOURS PRN
Qty: 15 TABLET | Refills: 0 | Status: SHIPPED | OUTPATIENT
Start: 2024-01-04 | End: 2024-01-09

## 2024-01-04 NOTE — ASSESSMENT & PLAN NOTE
Patient here today with complaints of nausea, diarrhea, and abdominal pain that has been present for the past 2 days. He denies any associated fever or known ill contacts. He states he did experience chills and body aches last night. In office flu and COVID test was negative. Will treat today with Zofran and encouraged increased hydration, rest, along with a bland diet. Patient to call back or return to clinic with any worsening symptoms or if not improved.

## 2024-01-07 ASSESSMENT — ENCOUNTER SYMPTOMS
CHEST TIGHTNESS: 0
ABDOMINAL PAIN: 1
SORE THROAT: 0
VOMITING: 0
NAUSEA: 1
COLOR CHANGE: 0
COUGH: 0
SHORTNESS OF BREATH: 0
DIARRHEA: 1

## 2024-01-11 ENCOUNTER — TELEPHONE (OUTPATIENT)
Dept: PRIMARY CARE CLINIC | Age: 39
End: 2024-01-11

## 2024-01-11 ENCOUNTER — OFFICE VISIT (OUTPATIENT)
Dept: PRIMARY CARE CLINIC | Age: 39
End: 2024-01-11
Payer: COMMERCIAL

## 2024-01-11 ENCOUNTER — HOSPITAL ENCOUNTER (OUTPATIENT)
Dept: GENERAL RADIOLOGY | Age: 39
Discharge: HOME | End: 2024-01-11
Payer: COMMERCIAL

## 2024-01-11 VITALS
WEIGHT: 238 LBS | DIASTOLIC BLOOD PRESSURE: 86 MMHG | BODY MASS INDEX: 31.54 KG/M2 | SYSTOLIC BLOOD PRESSURE: 126 MMHG | HEIGHT: 73 IN | HEART RATE: 76 BPM | OXYGEN SATURATION: 98 % | TEMPERATURE: 97.6 F

## 2024-01-11 DIAGNOSIS — R10.84 GENERALIZED ABDOMINAL PAIN: ICD-10-CM

## 2024-01-11 DIAGNOSIS — R10.84 GENERALIZED ABDOMINAL PAIN: Primary | ICD-10-CM

## 2024-01-11 DIAGNOSIS — N52.9 ERECTILE DYSFUNCTION, UNSPECIFIED ERECTILE DYSFUNCTION TYPE: ICD-10-CM

## 2024-01-11 LAB
ALBUMIN SERPL-MCNC: 4.6 G/DL (ref 3.5–5.2)
ALP SERPL-CCNC: 107 U/L (ref 40–130)
ALT SERPL-CCNC: 125 U/L (ref 5–41)
ANION GAP SERPL CALCULATED.3IONS-SCNC: 11 MMOL/L (ref 7–19)
AST SERPL-CCNC: 71 U/L (ref 5–40)
BASOPHILS # BLD: 0.1 K/UL (ref 0–0.2)
BASOPHILS NFR BLD: 2 % (ref 0–1)
BILIRUB SERPL-MCNC: 1.2 MG/DL (ref 0.2–1.2)
BUN SERPL-MCNC: 8 MG/DL (ref 6–20)
CALCIUM SERPL-MCNC: 9.5 MG/DL (ref 8.6–10)
CHLORIDE SERPL-SCNC: 105 MMOL/L (ref 98–111)
CO2 SERPL-SCNC: 27 MMOL/L (ref 22–29)
CREAT SERPL-MCNC: 1 MG/DL (ref 0.5–1.2)
DACRYOCYTES BLD QL SMEAR: ABNORMAL
EOSINOPHIL # BLD: 0.14 K/UL (ref 0–0.6)
EOSINOPHIL NFR BLD: 3 % (ref 0–5)
ERYTHROCYTE [DISTWIDTH] IN BLOOD BY AUTOMATED COUNT: 12.9 % (ref 11.5–14.5)
GLUCOSE SERPL-MCNC: 98 MG/DL (ref 74–109)
HCT VFR BLD AUTO: 42.8 % (ref 42–52)
HGB BLD-MCNC: 14.8 G/DL (ref 14–18)
IMM GRANULOCYTES # BLD: 0 K/UL
LYMPHOCYTES # BLD: 1.5 K/UL (ref 1.1–4.5)
LYMPHOCYTES NFR BLD: 32 % (ref 20–40)
MCH RBC QN AUTO: 29.5 PG (ref 27–31)
MCHC RBC AUTO-ENTMCNC: 34.6 G/DL (ref 33–37)
MCV RBC AUTO: 85.3 FL (ref 80–94)
MONOCYTES # BLD: 0.3 K/UL (ref 0–0.9)
MONOCYTES NFR BLD: 6 % (ref 0–10)
NEUTROPHILS # BLD: 2.6 K/UL (ref 1.5–7.5)
NEUTS SEG NFR BLD: 56 % (ref 50–65)
OVALOCYTES BLD QL SMEAR: ABNORMAL
PLATELET # BLD AUTO: 250 K/UL (ref 130–400)
PLATELET SLIDE REVIEW: ADEQUATE
PMV BLD AUTO: 10.1 FL (ref 9.4–12.4)
POTASSIUM SERPL-SCNC: 4.1 MMOL/L (ref 3.5–5)
PROT SERPL-MCNC: 6.7 G/DL (ref 6.6–8.7)
RBC # BLD AUTO: 5.02 M/UL (ref 4.7–6.1)
SODIUM SERPL-SCNC: 143 MMOL/L (ref 136–145)
VARIANT LYMPHS NFR BLD: 1 % (ref 0–8)
WBC # BLD AUTO: 4.6 K/UL (ref 4.8–10.8)

## 2024-01-11 PROCEDURE — 74019 RADEX ABDOMEN 2 VIEWS: CPT

## 2024-01-11 PROCEDURE — 99213 OFFICE O/P EST LOW 20 MIN: CPT | Performed by: FAMILY MEDICINE

## 2024-01-11 ASSESSMENT — PATIENT HEALTH QUESTIONNAIRE - PHQ9
SUM OF ALL RESPONSES TO PHQ QUESTIONS 1-9: 0
SUM OF ALL RESPONSES TO PHQ9 QUESTIONS 1 & 2: 0
2. FEELING DOWN, DEPRESSED OR HOPELESS: 0
1. LITTLE INTEREST OR PLEASURE IN DOING THINGS: 0
SUM OF ALL RESPONSES TO PHQ QUESTIONS 1-9: 0

## 2024-01-11 ASSESSMENT — ENCOUNTER SYMPTOMS
ABDOMINAL DISTENTION: 1
DIARRHEA: 1
CONSTIPATION: 1

## 2024-01-11 NOTE — TELEPHONE ENCOUNTER
----- Message from Melissa Ding MD sent at 1/11/2024  1:56 PM CST -----  CBC result  suggest a viral infection. The presence of ovalocyte and  tear drop cells in the setting of a recent infection is not too alarming anup when I compared this CBC with previous one done last year.  I woul dstill probably recheck  CBC again in a few weeks to make sure those cells are gone    CMP is also normal except for sl elevated ALT  and AST which again can be from recent infection    Still waiting on XR abdomen!!

## 2024-01-11 NOTE — PROGRESS NOTES
[facility]  [address]  [address]  [place] [address]  Dept: [phone]  Dept Fax: [phone]  Loc: [phone]      Subjective:     Chief Complaint   Patient presents with    GI Problem       HPI:  Germán Perla is a 38 y.o. male presents today for follow-up of visit with NP last week when he presented  with abdominal pain , nausea and diarrhea. He states that he took Pepto Bismol and Imodium. He was prescribed Zofran and it took a while before the nausea got better. He states that the diarrhea stopped 2 days ago. He has not had BM since 2 days ago but his abdomen is still sore to touch.  He also states that this past weekend while camping with his wife for her birthday, he could not achieve an erection. He had a similar problem 2 weeks before. No prior problem wit ED until now       ROS:   Review of Systems   Gastrointestinal:  Positive for abdominal distention, constipation (no BM for 2 days) and diarrhea (resolved).       PMHx:  Past Medical History:   Diagnosis Date    Anxiety     patient takes clonazepam as needed     Patient Active Problem List   Diagnosis    Nausea       PSHx:  Past Surgical History:   Procedure Laterality Date    COLONOSCOPY N/A 11/20/2023    Dr LAURA Traylor-AP x 1, 5 yr recall    EAR SURGERY Bilateral 1996    had ears pinned back    TONSILLECTOMY  2008    WISDOM TOOTH EXTRACTION  1998       PFHx:  Family History   Problem Relation Age of Onset    No Known Problems Mother     Prostate Cancer Father     Breast Cancer Maternal Aunt         40-45       SocialHx:  Social History     Tobacco Use    Smoking status: Never    Smokeless tobacco: Never   Substance Use Topics    Alcohol use: Yes     Alcohol/week: 3.0 standard drinks of alcohol     Types: 3 Cans of beer per week       Allergies:  No Known Allergies    Medications:  Current Outpatient Medications   Medication Sig Dispense Refill    zolpidem (AMBIEN CR) 12.5 MG extended release

## 2024-01-16 ENCOUNTER — TELEPHONE (OUTPATIENT)
Dept: PRIMARY CARE CLINIC | Age: 39
End: 2024-01-16

## 2024-02-04 DIAGNOSIS — G47.00 INSOMNIA, UNSPECIFIED TYPE: ICD-10-CM

## 2024-02-05 RX ORDER — ZOLPIDEM TARTRATE 12.5 MG/1
TABLET, FILM COATED, EXTENDED RELEASE ORAL
Qty: 30 TABLET | Refills: 0 | Status: SHIPPED | OUTPATIENT
Start: 2024-02-05 | End: 2024-03-06

## 2024-02-05 NOTE — TELEPHONE ENCOUNTER
Germán Perla called to request a refill on his medication.      Last office visit : 1/11/2024   Next office visit : 2/15/2024     Last UDS:   Amphetamine Screen, Urine   Date Value Ref Range Status   02/23/2023 Negative  Final     Barbiturate Screen, Urine   Date Value Ref Range Status   02/23/2023 Negative  Final     Benzodiazepine Screen, Urine   Date Value Ref Range Status   02/23/2023 Negative  Final     Buprenorphine Urine   Date Value Ref Range Status   02/23/2023 Negative  Final     Cocaine Metabolite Screen, Urine   Date Value Ref Range Status   02/23/2023 Negative  Final     Gabapentin Screen, Urine   Date Value Ref Range Status   02/23/2023 Negative  Final     MDMA, Urine   Date Value Ref Range Status   02/23/2023 Negative  Final     Methamphetamine, Urine   Date Value Ref Range Status   02/23/2023 Negative  Final     Opiate Scrn, Ur   Date Value Ref Range Status   02/23/2023 Negative  Final     Oxycodone Screen, Ur   Date Value Ref Range Status   02/23/2023 Negative  Final     PCP Screen, Urine   Date Value Ref Range Status   02/23/2023 Negative  Final     Propoxyphene Screen, Urine   Date Value Ref Range Status   02/23/2023 Negative  Final     THC Screen, Urine   Date Value Ref Range Status   02/23/2023 Negative  Final     Tricyclic Antidepressants, Urine   Date Value Ref Range Status   02/23/2023 Negative  Final       Last Torin: 2/5/24  Medication Contract: 2/23/23   Last Fill: 1/2/24    Requested Prescriptions     Pending Prescriptions Disp Refills    zolpidem (AMBIEN CR) 12.5 MG extended release tablet [Pharmacy Med Name: Zolpidem Tartrate ER 12.5 MG Oral Tablet Extended Release] 30 tablet 0     Sig: TAKE 1 TABLET BY MOUTH ONCE DAILY AT NIGHT AS NEEDED FOR SLEEP                               Please approve or refuse this medication.   Cammy Lam LPN

## 2024-03-07 ENCOUNTER — OFFICE VISIT (OUTPATIENT)
Dept: PRIMARY CARE CLINIC | Age: 39
End: 2024-03-07
Payer: COMMERCIAL

## 2024-03-07 VITALS
SYSTOLIC BLOOD PRESSURE: 112 MMHG | DIASTOLIC BLOOD PRESSURE: 72 MMHG | OXYGEN SATURATION: 97 % | HEART RATE: 90 BPM | TEMPERATURE: 97.1 F | HEIGHT: 73 IN | BODY MASS INDEX: 29.26 KG/M2 | WEIGHT: 220.8 LBS

## 2024-03-07 DIAGNOSIS — F41.1 GAD (GENERALIZED ANXIETY DISORDER): ICD-10-CM

## 2024-03-07 DIAGNOSIS — G47.00 INSOMNIA, UNSPECIFIED TYPE: Primary | ICD-10-CM

## 2024-03-07 PROCEDURE — 99213 OFFICE O/P EST LOW 20 MIN: CPT | Performed by: FAMILY MEDICINE

## 2024-03-07 RX ORDER — ZOLPIDEM TARTRATE 12.5 MG/1
12.5 TABLET, FILM COATED, EXTENDED RELEASE ORAL NIGHTLY PRN
Qty: 30 TABLET | Refills: 0 | Status: SHIPPED | OUTPATIENT
Start: 2024-03-07 | End: 2024-04-06

## 2024-03-07 ASSESSMENT — ENCOUNTER SYMPTOMS
GASTROINTESTINAL NEGATIVE: 1
RESPIRATORY NEGATIVE: 1
EYES NEGATIVE: 1

## 2024-03-07 NOTE — PROGRESS NOTES
[facility]  [address]  [address]  [place] [address]  Dept: [phone]  Dept Fax: [phone]  Loc: [phone]      Subjective:     Chief Complaint   Patient presents with    6 Month Follow-Up       HPI:  Germán Perla is a 38 y.o. male presents today for his routine follow-up visit, med check and refills. He states that he doing quite well. He is due for refill of zolpidem which he takes every night 1/2 to 3/4 tablet. 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 Pt  is the  for [employer].   Overall, he states he is well and feels healthy.      ROS:   Review of Systems   Constitutional: Negative.    HENT: Negative.     Eyes: Negative.    Respiratory: Negative.     Cardiovascular: Negative.    Gastrointestinal: Negative.    Endocrine: Negative.    Genitourinary: Negative.    Musculoskeletal: Negative.    Neurological: Negative.    Psychiatric/Behavioral:  Positive for sleep disturbance (insomnia - does well on Ambien). The patient is not nervous/anxious.        PMHx:  Past Medical History:   Diagnosis Date    Anxiety     patient takes clonazepam as needed     Patient Active Problem List   Diagnosis    Nausea       PSHx:  Past Surgical History:   Procedure Laterality Date    COLONOSCOPY N/A 11/20/2023    Dr LAURA Traylor-AP x 1, 5 yr recall    EAR SURGERY Bilateral 1996    had ears pinned back    TONSILLECTOMY  2008    WISDOM TOOTH EXTRACTION  1998       PFHx:  Family History   Problem Relation Age of Onset    No Known Problems Mother     Prostate Cancer Father     Breast Cancer Maternal Aunt         40-45       SocialHx:  Social History     Tobacco Use    Smoking status: Never    Smokeless tobacco: Never   Substance Use Topics    Alcohol use: Yes     Alcohol/week: 3.0 standard drinks of alcohol     Types: 3 Cans of beer per week       Allergies:  No Known

## 2024-03-18 DIAGNOSIS — F41.1 GAD (GENERALIZED ANXIETY DISORDER): Primary | ICD-10-CM

## 2024-03-18 DIAGNOSIS — G47.00 INSOMNIA, UNSPECIFIED TYPE: ICD-10-CM

## 2024-03-18 RX ORDER — TEMAZEPAM 7.5 MG/1
7.5 CAPSULE ORAL NIGHTLY PRN
Qty: 30 CAPSULE | Refills: 0 | Status: SHIPPED | OUTPATIENT
Start: 2024-03-18 | End: 2024-04-17

## 2024-04-02 ENCOUNTER — TELEPHONE (OUTPATIENT)
Dept: PRIMARY CARE CLINIC | Age: 39
End: 2024-04-02

## 2024-04-02 NOTE — TELEPHONE ENCOUNTER
Patient called and stated the temazepam that was prescribed for sleep is not working. He is wanting to know what to do from here please advise?

## 2024-04-03 NOTE — TELEPHONE ENCOUNTER
Instruct  pt to take 2 capsule instead ( 15 mg/night). If this works better, I will send a new Rx to his pharmacy

## 2024-04-09 ENCOUNTER — TELEMEDICINE (OUTPATIENT)
Dept: PRIMARY CARE CLINIC | Age: 39
End: 2024-04-09
Payer: COMMERCIAL

## 2024-04-09 DIAGNOSIS — F41.1 GAD (GENERALIZED ANXIETY DISORDER): ICD-10-CM

## 2024-04-09 DIAGNOSIS — G47.00 INSOMNIA, UNSPECIFIED TYPE: Primary | ICD-10-CM

## 2024-04-09 PROCEDURE — 99422 OL DIG E/M SVC 11-20 MIN: CPT | Performed by: FAMILY MEDICINE

## 2024-04-09 RX ORDER — TEMAZEPAM 30 MG/1
30 CAPSULE ORAL NIGHTLY PRN
Qty: 30 CAPSULE | Refills: 0 | Status: SHIPPED | OUTPATIENT
Start: 2024-04-09 | End: 2024-05-09

## 2024-04-09 ASSESSMENT — ENCOUNTER SYMPTOMS
RESPIRATORY NEGATIVE: 1
GASTROINTESTINAL NEGATIVE: 1
EYES NEGATIVE: 1

## 2024-04-09 NOTE — PROGRESS NOTES
Germán Perla, was evaluated through a synchronous (real-time) audio-video encounter. The patient (or guardian if applicable) is aware that this is a billable service, which includes applicable co-pays. This Virtual Visit was conducted with patient's (and/or legal guardian's) consent. Patient identification was verified, and a caregiver was present when appropriate.   The patient was located at Other: work  Provider was located at Facility (Appt Dept): [address] [place] [address]  [address]  [place] [address]  Confirm you are appropriately licensed, registered, or certified to deliver care in the state where the patient is located as indicated above. If you are not or unsure, please re-schedule the visit: Yes, I confirm.     Germán Perla (:  1985) is a Established patient, presenting virtually for evaluation of the following:    Assessment & Plan   Below is the assessment and plan developed based on review of pertinent history, physical exam, labs, studies, and medications.  1. Insomnia, unspecified type  -     temazepam (RESTORIL) 30 MG capsule; Take 1 capsule by mouth nightly as needed for Sleep for up to 30 days. Max Daily Amount: 30 mg, Disp-30 capsule, R-0Normal  2. REBECCA (generalized anxiety disorder)  -     temazepam (RESTORIL) 30 MG capsule; Take 1 capsule by mouth nightly as needed for Sleep for up to 30 days. Max Daily Amount: 30 mg, Disp-30 capsule, R-0Normal    No follow-ups on file.       Subjective   HPI    Pt  states that the Temazepam did not work as well as he expected. He was started on a lower dose with plan to titrate dose accordingly. He states that he has been working with his therapist to learn how to cope with stress and anxiety.   He wanted to try a new medication Dayvigo as recommended by his therapist but since I do not have any experience on this new drug, I offered to increase the dose of Temazepam first  Pt was amenable to this.    Review of Systems   Constitutional: Negative.

## 2024-05-22 ENCOUNTER — PATIENT MESSAGE (OUTPATIENT)
Dept: PRIMARY CARE CLINIC | Age: 39
End: 2024-05-22

## 2024-05-22 DIAGNOSIS — F41.1 GAD (GENERALIZED ANXIETY DISORDER): ICD-10-CM

## 2024-05-22 DIAGNOSIS — G47.00 INSOMNIA, UNSPECIFIED TYPE: ICD-10-CM

## 2024-05-22 NOTE — TELEPHONE ENCOUNTER
Germán Perla called to request a refill on his medication.      Last office visit : 4/9/2024   Next office visit : 6/11/2024     Last UDS:   Benzodiazepine Screen, Urine   Date Value Ref Range Status   02/23/2023 Negative  Final     Buprenorphine Urine   Date Value Ref Range Status   02/23/2023 Negative  Final     Cocaine Metabolite Screen, Urine   Date Value Ref Range Status   02/23/2023 Negative  Final     Gabapentin Screen, Urine   Date Value Ref Range Status   02/23/2023 Negative  Final     MDMA, Urine   Date Value Ref Range Status   02/23/2023 Negative  Final     Oxycodone Screen, Ur   Date Value Ref Range Status   02/23/2023 Negative  Final     Propoxyphene Screen, Urine   Date Value Ref Range Status   02/23/2023 Negative  Final     THC Screen, Urine   Date Value Ref Range Status   02/23/2023 Negative  Final     Tricyclic Antidepressants, Urine   Date Value Ref Range Status   02/23/2023 Negative  Final       Last Torin: 05/22/2024  Medication Contract: 02/23/2023  Last Fill: 04/09/2024    Requested Prescriptions     Pending Prescriptions Disp Refills    temazepam (RESTORIL) 30 MG capsule 60 capsule 0     Sig: Take 2 capsules by mouth at bedtime         Please approve or refuse this medication.   Eliza Irving MA

## 2024-05-22 NOTE — TELEPHONE ENCOUNTER
From: Germán Perla  To: Dr. Melissa Ding  Sent: 5/22/2024 3:30 PM CDT  Subject: Temazepam refill    Hi Dr Ding, the first 30 days of the Temazepam higher dosage worked much better. I'd like to continue using that if possible. I've sent a refill request to [facility], please let me know if you have any questions!

## 2024-05-23 RX ORDER — TEMAZEPAM 30 MG/1
CAPSULE ORAL
Qty: 60 CAPSULE | Refills: 0 | Status: SHIPPED | OUTPATIENT
Start: 2024-05-23 | End: 2024-06-21

## 2024-06-17 SDOH — ECONOMIC STABILITY: HOUSING INSECURITY
IN THE LAST 12 MONTHS, WAS THERE A TIME WHEN YOU DID NOT HAVE A STEADY PLACE TO SLEEP OR SLEPT IN A SHELTER (INCLUDING NOW)?: NO

## 2024-06-17 SDOH — ECONOMIC STABILITY: FOOD INSECURITY: WITHIN THE PAST 12 MONTHS, YOU WORRIED THAT YOUR FOOD WOULD RUN OUT BEFORE YOU GOT MONEY TO BUY MORE.: NEVER TRUE

## 2024-06-17 SDOH — ECONOMIC STABILITY: FOOD INSECURITY: WITHIN THE PAST 12 MONTHS, THE FOOD YOU BOUGHT JUST DIDN'T LAST AND YOU DIDN'T HAVE MONEY TO GET MORE.: NEVER TRUE

## 2024-06-17 SDOH — ECONOMIC STABILITY: INCOME INSECURITY: HOW HARD IS IT FOR YOU TO PAY FOR THE VERY BASICS LIKE FOOD, HOUSING, MEDICAL CARE, AND HEATING?: NOT VERY HARD

## 2024-06-17 SDOH — ECONOMIC STABILITY: TRANSPORTATION INSECURITY
IN THE PAST 12 MONTHS, HAS LACK OF TRANSPORTATION KEPT YOU FROM MEETINGS, WORK, OR FROM GETTING THINGS NEEDED FOR DAILY LIVING?: NO

## 2024-06-18 ENCOUNTER — OFFICE VISIT (OUTPATIENT)
Dept: PRIMARY CARE CLINIC | Age: 39
End: 2024-06-18
Payer: COMMERCIAL

## 2024-06-18 VITALS
HEART RATE: 83 BPM | SYSTOLIC BLOOD PRESSURE: 102 MMHG | HEIGHT: 73 IN | WEIGHT: 228 LBS | BODY MASS INDEX: 30.22 KG/M2 | TEMPERATURE: 97.3 F | OXYGEN SATURATION: 97 % | DIASTOLIC BLOOD PRESSURE: 74 MMHG

## 2024-06-18 DIAGNOSIS — G47.00 INSOMNIA, UNSPECIFIED TYPE: Primary | ICD-10-CM

## 2024-06-18 DIAGNOSIS — Z01.89 ROUTINE LAB DRAW: ICD-10-CM

## 2024-06-18 DIAGNOSIS — F41.1 GAD (GENERALIZED ANXIETY DISORDER): ICD-10-CM

## 2024-06-18 PROCEDURE — 99213 OFFICE O/P EST LOW 20 MIN: CPT | Performed by: FAMILY MEDICINE

## 2024-06-18 ASSESSMENT — ENCOUNTER SYMPTOMS
RESPIRATORY NEGATIVE: 1
EYES NEGATIVE: 1
GASTROINTESTINAL NEGATIVE: 1

## 2024-06-18 NOTE — PROGRESS NOTES
[facility]  [address]  [address]  [place] [address]  Dept: [phone]  Dept Fax: [phone]  Loc: [phone]      Subjective:     Chief Complaint   Patient presents with    3 Month Follow-Up       HPI:  Germán Perla is a 39 y.o. male presents today for his 3 month follow-up  PMHx and problem list reviewed. No significant change reported.  Chronic med reviewed. He is tolerating Temazepam 30 mg. He states that most nights he only take 1 capsule. However, there are nights when he feels he has to take 2 capsules.   No new problems voiced  He states he feels well. VSS.      ROS:   Review of Systems   Constitutional: Negative.    HENT: Negative.     Eyes: Negative.    Respiratory: Negative.     Cardiovascular: Negative.    Gastrointestinal: Negative.    Endocrine: Negative.    Genitourinary: Negative.    Musculoskeletal: Negative.    Neurological: Negative.    Psychiatric/Behavioral:  Positive for sleep disturbance (insomnia -  stable on current med). The patient is not nervous/anxious.        PMHx:  Past Medical History:   Diagnosis Date    Anxiety     patient takes clonazepam as needed     Patient Active Problem List   Diagnosis    Nausea       PSHx:  Past Surgical History:   Procedure Laterality Date    COLONOSCOPY N/A 11/20/2023    Dr LAURA Traylor-AP x 1, 5 yr recall    EAR SURGERY Bilateral 1996    had ears pinned back    TONSILLECTOMY  2008    WISDOM TOOTH EXTRACTION  1998       PFHx:  Family History   Problem Relation Age of Onset    No Known Problems Mother     Prostate Cancer Father     Breast Cancer Maternal Aunt         40-45       SocialHx:  Social History     Tobacco Use    Smoking status: Never    Smokeless tobacco: Never   Substance Use Topics    Alcohol use: Yes     Alcohol/week: 3.0 standard drinks of alcohol     Types: 3 Cans of beer per week       Allergies:  No Known Allergies    Medications:  Current Outpatient Medications   Medication Sig

## 2024-07-09 DIAGNOSIS — F41.1 GAD (GENERALIZED ANXIETY DISORDER): ICD-10-CM

## 2024-07-09 DIAGNOSIS — G47.00 INSOMNIA, UNSPECIFIED TYPE: ICD-10-CM

## 2024-07-09 RX ORDER — TEMAZEPAM 30 MG/1
CAPSULE ORAL
Qty: 30 CAPSULE | Refills: 0 | Status: SHIPPED | OUTPATIENT
Start: 2024-07-09 | End: 2024-08-09

## 2024-07-09 NOTE — TELEPHONE ENCOUNTER
Germán Perla called to request a refill on his medication.      Last office visit : 6/18/2024   Next office visit : 9/18/2024     Last UDS:   Benzodiazepine Screen, Urine   Date Value Ref Range Status   02/23/2023 Negative  Final     Buprenorphine Urine   Date Value Ref Range Status   02/23/2023 Negative  Final     Cocaine Metabolite Screen, Urine   Date Value Ref Range Status   02/23/2023 Negative  Final     Gabapentin Screen, Urine   Date Value Ref Range Status   02/23/2023 Negative  Final     MDMA, Urine   Date Value Ref Range Status   02/23/2023 Negative  Final     Oxycodone Screen, Ur   Date Value Ref Range Status   02/23/2023 Negative  Final     Propoxyphene Screen, Urine   Date Value Ref Range Status   02/23/2023 Negative  Final     THC Screen, Urine   Date Value Ref Range Status   02/23/2023 Negative  Final     Tricyclic Antidepressants, Urine   Date Value Ref Range Status   02/23/2023 Negative  Final       Last Torin: 05-  Medication Contract: 02-  Last Fill: 05-    Requested Prescriptions     Pending Prescriptions Disp Refills    temazepam (RESTORIL) 30 MG capsule [Pharmacy Med Name: Temazepam 30 MG Oral Capsule] 60 capsule 0     Sig: TAKE 2 CAPSULES BY MOUTH AT BEDTIME         Please approve or refuse this medication.   Eliza Irving MA

## 2024-07-10 DIAGNOSIS — G47.00 INSOMNIA, UNSPECIFIED TYPE: ICD-10-CM

## 2024-07-10 DIAGNOSIS — F41.1 GAD (GENERALIZED ANXIETY DISORDER): ICD-10-CM

## 2024-07-10 RX ORDER — TEMAZEPAM 30 MG
CAPSULE ORAL
Qty: 30 CAPSULE | Refills: 0 | OUTPATIENT
Start: 2024-07-10 | End: 2024-08-10

## 2024-07-15 DIAGNOSIS — G47.00 INSOMNIA, UNSPECIFIED TYPE: ICD-10-CM

## 2024-07-15 DIAGNOSIS — F41.1 GAD (GENERALIZED ANXIETY DISORDER): ICD-10-CM

## 2024-07-16 RX ORDER — TEMAZEPAM 30 MG
CAPSULE ORAL
Qty: 30 CAPSULE | Refills: 0 | OUTPATIENT
Start: 2024-07-16 | End: 2024-08-15

## 2024-07-23 DIAGNOSIS — G47.00 INSOMNIA, UNSPECIFIED TYPE: ICD-10-CM

## 2024-07-23 DIAGNOSIS — F41.1 GAD (GENERALIZED ANXIETY DISORDER): ICD-10-CM

## 2024-07-23 RX ORDER — TEMAZEPAM 30 MG/1
CAPSULE ORAL
Qty: 30 CAPSULE | Refills: 0 | Status: SHIPPED | OUTPATIENT
Start: 2024-07-23 | End: 2024-08-23

## 2024-08-21 DIAGNOSIS — G47.00 INSOMNIA, UNSPECIFIED TYPE: ICD-10-CM

## 2024-08-21 DIAGNOSIS — F41.1 GAD (GENERALIZED ANXIETY DISORDER): ICD-10-CM

## 2024-08-21 RX ORDER — TEMAZEPAM 30 MG/1
CAPSULE ORAL
Qty: 30 CAPSULE | Refills: 0 | Status: SHIPPED | OUTPATIENT
Start: 2024-08-22 | End: 2024-09-21

## 2024-09-11 DIAGNOSIS — Z01.89 ROUTINE LAB DRAW: ICD-10-CM

## 2024-09-11 LAB
ALBUMIN SERPL-MCNC: 4.5 G/DL (ref 3.5–5.2)
ALP SERPL-CCNC: 97 U/L (ref 40–129)
ALT SERPL-CCNC: 31 U/L (ref 5–41)
ANION GAP SERPL CALCULATED.3IONS-SCNC: 9 MMOL/L (ref 7–19)
AST SERPL-CCNC: 21 U/L (ref 5–40)
BASOPHILS # BLD: 0 K/UL (ref 0–0.2)
BASOPHILS NFR BLD: 0.8 % (ref 0–1)
BILIRUB SERPL-MCNC: 1.2 MG/DL (ref 0.2–1.2)
BUN SERPL-MCNC: 14 MG/DL (ref 6–20)
CALCIUM SERPL-MCNC: 9.2 MG/DL (ref 8.6–10)
CHLORIDE SERPL-SCNC: 106 MMOL/L (ref 98–111)
CHOLEST SERPL-MCNC: 165 MG/DL (ref 0–199)
CO2 SERPL-SCNC: 26 MMOL/L (ref 22–29)
CREAT SERPL-MCNC: 1 MG/DL (ref 0.7–1.2)
EOSINOPHIL # BLD: 0.2 K/UL (ref 0–0.6)
EOSINOPHIL NFR BLD: 4.4 % (ref 0–5)
ERYTHROCYTE [DISTWIDTH] IN BLOOD BY AUTOMATED COUNT: 12.5 % (ref 11.5–14.5)
GLUCOSE SERPL-MCNC: 94 MG/DL (ref 70–99)
HCT VFR BLD AUTO: 45.5 % (ref 42–52)
HDLC SERPL-MCNC: 43 MG/DL (ref 40–60)
HGB BLD-MCNC: 15.5 G/DL (ref 14–18)
IMM GRANULOCYTES # BLD: 0 K/UL
LDLC SERPL CALC-MCNC: 101 MG/DL
LYMPHOCYTES # BLD: 1.8 K/UL (ref 1.1–4.5)
LYMPHOCYTES NFR BLD: 36.8 % (ref 20–40)
MCH RBC QN AUTO: 29.8 PG (ref 27–31)
MCHC RBC AUTO-ENTMCNC: 34.1 G/DL (ref 33–37)
MCV RBC AUTO: 87.3 FL (ref 80–94)
MONOCYTES # BLD: 0.4 K/UL (ref 0–0.9)
MONOCYTES NFR BLD: 8.8 % (ref 0–10)
NEUTROPHILS # BLD: 2.3 K/UL (ref 1.5–7.5)
NEUTS SEG NFR BLD: 49 % (ref 50–65)
PLATELET # BLD AUTO: 185 K/UL (ref 130–400)
PMV BLD AUTO: 10.5 FL (ref 9.4–12.4)
POTASSIUM SERPL-SCNC: 3.9 MMOL/L (ref 3.5–5)
PROT SERPL-MCNC: 6.6 G/DL (ref 6.4–8.3)
RBC # BLD AUTO: 5.21 M/UL (ref 4.7–6.1)
SODIUM SERPL-SCNC: 141 MMOL/L (ref 136–145)
TRIGL SERPL-MCNC: 107 MG/DL (ref 0–149)
WBC # BLD AUTO: 4.8 K/UL (ref 4.8–10.8)

## 2024-09-16 ENCOUNTER — TELEPHONE (OUTPATIENT)
Dept: PRIMARY CARE CLINIC | Age: 39
End: 2024-09-16

## 2024-09-21 DIAGNOSIS — F41.1 GAD (GENERALIZED ANXIETY DISORDER): ICD-10-CM

## 2024-09-21 DIAGNOSIS — G47.00 INSOMNIA, UNSPECIFIED TYPE: ICD-10-CM

## 2024-09-23 RX ORDER — TEMAZEPAM 30 MG
CAPSULE ORAL
Qty: 30 CAPSULE | Refills: 0 | Status: SHIPPED | OUTPATIENT
Start: 2024-09-23 | End: 2024-10-21

## 2024-10-02 ENCOUNTER — OFFICE VISIT (OUTPATIENT)
Dept: PRIMARY CARE CLINIC | Age: 39
End: 2024-10-02
Payer: COMMERCIAL

## 2024-10-02 VITALS
HEIGHT: 73 IN | HEART RATE: 78 BPM | TEMPERATURE: 97.9 F | SYSTOLIC BLOOD PRESSURE: 112 MMHG | DIASTOLIC BLOOD PRESSURE: 76 MMHG | OXYGEN SATURATION: 97 % | WEIGHT: 232.2 LBS | BODY MASS INDEX: 30.77 KG/M2

## 2024-10-02 DIAGNOSIS — Z00.00 ENCOUNTER FOR WELL ADULT EXAM WITHOUT ABNORMAL FINDINGS: Primary | ICD-10-CM

## 2024-10-02 PROCEDURE — 99395 PREV VISIT EST AGE 18-39: CPT | Performed by: FAMILY MEDICINE

## 2024-10-02 ASSESSMENT — ENCOUNTER SYMPTOMS
WHEEZING: 0
CHEST TIGHTNESS: 0
DIARRHEA: 0
ABDOMINAL PAIN: 0
CONSTIPATION: 0
BACK PAIN: 0
VOMITING: 0
TROUBLE SWALLOWING: 0
EYE PAIN: 0
SORE THROAT: 0
NAUSEA: 0
COUGH: 0
SHORTNESS OF BREATH: 0

## 2024-10-02 NOTE — PROGRESS NOTES
Well Adult Note  Name: Germán Perla Today’s Date: 10/2/2024   MRN: [id number] Sex: Male   Age: 39 y.o. Ethnicity: Non- / Non    : 1985 Race: White (non-)      Germán Perla is here for a well adult exam.       Subjective   History:  Pt presents today for his annual physical and routine preventative visit.   PMHx reviewed. No significant change. Family and social history also reviewed. No recent ER or UC visit. No recent hospitalization.   Chronic medications reviewed, updated and reconciled  Pt does not smoke, drinks ETOH occasionally. He denies use of recreational drugs.  He is caught up with his vaccinations   He is also here to go over the result of his recent screening preventative labs.  All of his blood work came back normal  He states that he has been travelling a lot  for work and family occasions and is quite tired but overall, he feels well.     Review of Systems   Constitutional:  Negative for appetite change, chills, fatigue and fever.   HENT:  Negative for congestion, ear pain, hearing loss, nosebleeds, sore throat and trouble swallowing.    Eyes:  Negative for pain and visual disturbance.   Respiratory:  Negative for cough, chest tightness, shortness of breath and wheezing.    Cardiovascular:  Negative for chest pain, palpitations and leg swelling.   Gastrointestinal:  Negative for abdominal pain, constipation, diarrhea, nausea and vomiting.   Endocrine: Negative for cold intolerance, heat intolerance, polydipsia, polyphagia and polyuria.   Genitourinary:  Negative for difficulty urinating, dysuria, frequency, hematuria and urgency.   Musculoskeletal:  Negative for arthralgias, back pain, gait problem, joint swelling, myalgias, neck pain and neck stiffness.   Skin:  Negative for pallor and rash.   Allergic/Immunologic: Negative for environmental allergies and food allergies.   Neurological:  Negative for dizziness, weakness and numbness.   Hematological:  Negative for

## 2024-11-18 DIAGNOSIS — G47.00 INSOMNIA, UNSPECIFIED TYPE: ICD-10-CM

## 2024-11-18 DIAGNOSIS — F41.1 GAD (GENERALIZED ANXIETY DISORDER): ICD-10-CM

## 2024-11-19 RX ORDER — TEMAZEPAM 30 MG/1
CAPSULE ORAL
Qty: 30 CAPSULE | Refills: 0 | Status: SHIPPED | OUTPATIENT
Start: 2024-11-19 | End: 2024-12-19

## 2024-12-07 NOTE — PATIENT INSTRUCTIONS
We are committed to providing you with the best care possible.   In order to help us achieve these goals please remember to bring all medications, herbal products, and over the counter supplements with you to each visit.     If your provider has ordered testing for you, please be sure to follow up with our office if you have not received results within 7 days after the testing took place.     *If you receive a survey after visiting one of our offices, please take time to share your experience concerning your physician office visit. These surveys are confidential and no health information about you is shared.  We are eager to improve for you and we are counting on your feedback to help make that happen.         Well Visit, Ages 18 to 65: Care Instructions  Well visits can help you stay healthy. Your doctor has checked your overall health and may have suggested ways to take good care of yourself. Your doctor also may have recommended tests. You can help prevent illness with healthy eating, good sleep, vaccinations, regular exercise, and other steps.    Get the tests that you and your doctor decide on. Depending on your age and risks, examples might include screening for diabetes; hepatitis C; HIV; and cervical, breast, lung, and colon cancer. Screening helps find diseases before any symptoms appear.   Eat healthy foods. Choose fruits, vegetables, whole grains, lean protein, and low-fat dairy foods. Limit saturated fat and reduce salt.     Limit alcohol. Men should have no more than 2 drinks a day. Women should have no more than 1. For some people, no alcohol is the best choice.   Exercise. Get at least 30 minutes of exercise on most days of the week. Walking can be a good choice.     Reach and stay at your healthy weight. This will lower your risk for many health problems.   Take care of your mental health. Try to stay connected with friends, family, and community, and find ways to manage stress.     If you're feeling 
6 (moderate pain)

## 2024-12-19 DIAGNOSIS — F41.1 GAD (GENERALIZED ANXIETY DISORDER): ICD-10-CM

## 2024-12-19 DIAGNOSIS — G47.00 INSOMNIA, UNSPECIFIED TYPE: ICD-10-CM

## 2024-12-19 RX ORDER — TEMAZEPAM 30 MG/1
CAPSULE ORAL
Qty: 30 CAPSULE | Refills: 0 | Status: SHIPPED | OUTPATIENT
Start: 2024-12-19 | End: 2025-01-17

## 2024-12-19 NOTE — TELEPHONE ENCOUNTER
Germán Perla called to request a refill on his medication.      Last office visit : 10/2/2024   Next office visit : 4/2/2025     Last UDS:   Benzodiazepine Screen, Urine   Date Value Ref Range Status   02/23/2023 Negative  Final     Buprenorphine Urine   Date Value Ref Range Status   02/23/2023 Negative  Final     Cocaine Metabolite Screen, Urine   Date Value Ref Range Status   02/23/2023 Negative  Final     Gabapentin Screen, Urine   Date Value Ref Range Status   02/23/2023 Negative  Final     Oxycodone Screen, Ur   Date Value Ref Range Status   02/23/2023 Negative  Final     Propoxyphene Screen, Urine   Date Value Ref Range Status   02/23/2023 Negative  Final     THC Screen, Urine   Date Value Ref Range Status   02/23/2023 Negative  Final     Tricyclic Antidepressants, Urine   Date Value Ref Range Status   02/23/2023 Negative  Final       Last Torin: 12/19/2024  Medication Contract: 02-  Last Fill: 11/19/2024    Requested Prescriptions     Pending Prescriptions Disp Refills    temazepam (RESTORIL) 30 MG capsule 30 capsule 0     Sig: Take 1 capsule by mouth at bedtime         Please approve or refuse this medication.   Eliza Irving MA

## 2025-01-17 ENCOUNTER — NURSE ONLY (OUTPATIENT)
Dept: PRIMARY CARE CLINIC | Age: 40
End: 2025-01-17
Payer: COMMERCIAL

## 2025-01-17 DIAGNOSIS — Z79.899 DRUG THERAPY: Primary | ICD-10-CM

## 2025-01-17 LAB
ALCOHOL URINE: NEGATIVE
AMPHETAMINE SCREEN URINE: NORMAL
BARBITURATE SCREEN URINE: NEGATIVE
BENZODIAZEPINE SCREEN, URINE: POSITIVE
BUPRENORPHINE URINE: NEGATIVE
COCAINE METABOLITE SCREEN URINE: NEGATIVE
FENTANYL SCREEN, URINE: NEGATIVE
GABAPENTIN SCREEN, URINE: NORMAL
MDMA, URINE: NEGATIVE
METHADONE SCREEN, URINE: NEGATIVE
METHAMPHETAMINE, URINE: NEGATIVE
OPIATE SCREEN URINE: NEGATIVE
OXYCODONE SCREEN URINE: NEGATIVE
PHENCYCLIDINE SCREEN URINE: NEGATIVE
PROPOXYPHENE SCREEN, URINE: NEGATIVE
SYNTHETIC CANNABINOIDS(K2) SCREEN, URINE: NEGATIVE
THC SCREEN, URINE: NEGATIVE
TRAMADOL SCREEN URINE: NEGATIVE
TRICYCLIC ANTIDEPRESSANTS, UR: NEGATIVE

## 2025-01-17 PROCEDURE — 80305 DRUG TEST PRSMV DIR OPT OBS: CPT | Performed by: FAMILY MEDICINE

## 2025-01-21 DIAGNOSIS — F41.1 GAD (GENERALIZED ANXIETY DISORDER): ICD-10-CM

## 2025-01-21 DIAGNOSIS — G47.00 INSOMNIA, UNSPECIFIED TYPE: ICD-10-CM

## 2025-01-21 RX ORDER — TEMAZEPAM 30 MG/1
CAPSULE ORAL
Qty: 30 CAPSULE | Refills: 0 | Status: SHIPPED | OUTPATIENT
Start: 2025-01-21 | End: 2025-02-21

## 2025-01-21 NOTE — TELEPHONE ENCOUNTER
Germán Perla called to request a refill on his medication.      Last office visit : 10/2/2024   Next office visit : 4/2/2025     Last UDS:   Benzodiazepine Screen, Urine   Date Value Ref Range Status   01/17/2025 positive  Final     Buprenorphine Urine   Date Value Ref Range Status   01/17/2025 negative  Final     Cocaine Metabolite Screen, Urine   Date Value Ref Range Status   01/17/2025 negative  Final     Gabapentin Screen, Urine   Date Value Ref Range Status   02/23/2023 Negative  Final     Oxycodone Screen, Ur   Date Value Ref Range Status   01/17/2025 negative  Final     Propoxyphene Screen, Urine   Date Value Ref Range Status   01/17/2025 negative  Final     THC Screen, Urine   Date Value Ref Range Status   01/17/2025 negative  Final     Tricyclic Antidepressants, Urine   Date Value Ref Range Status   01/17/2025 negative  Final       Last Torin: 12/19/24  Medication Contract: 1/17/25   Last Fill: 12/19/24    Requested Prescriptions     Pending Prescriptions Disp Refills    temazepam (RESTORIL) 30 MG capsule [Pharmacy Med Name: Temazepam 30 MG Oral Capsule] 30 capsule 0     Sig: Take 1 capsule by mouth at bedtime                   Please approve or refuse this medication.   Cammy Lam LPN

## 2025-02-18 DIAGNOSIS — F41.1 GAD (GENERALIZED ANXIETY DISORDER): ICD-10-CM

## 2025-02-18 DIAGNOSIS — G47.00 INSOMNIA, UNSPECIFIED TYPE: ICD-10-CM

## 2025-02-18 RX ORDER — TEMAZEPAM 30 MG/1
CAPSULE ORAL
Qty: 30 CAPSULE | Refills: 0 | Status: SHIPPED | OUTPATIENT
Start: 2025-02-20 | End: 2025-03-19

## 2025-02-18 NOTE — TELEPHONE ENCOUNTER
Germán Perla called to request a refill on his medication.      Last office visit : 10/2/2024   Next office visit : 4/2/2025     Last UDS:   Benzodiazepine Screen, Urine   Date Value Ref Range Status   01/17/2025 positive  Final     Buprenorphine Urine   Date Value Ref Range Status   01/17/2025 negative  Final     Cocaine Metabolite Screen, Urine   Date Value Ref Range Status   01/17/2025 negative  Final     Gabapentin Screen, Urine   Date Value Ref Range Status   02/23/2023 Negative  Final     Oxycodone Screen, Ur   Date Value Ref Range Status   01/17/2025 negative  Final     Propoxyphene Screen, Urine   Date Value Ref Range Status   01/17/2025 negative  Final     THC Screen, Urine   Date Value Ref Range Status   01/17/2025 negative  Final     Tricyclic Antidepressants, Urine   Date Value Ref Range Status   01/17/2025 negative  Final       Last Torin: 12/19/2024  Medication Contract: 01-  Last Fill: 01-    Requested Prescriptions     Pending Prescriptions Disp Refills    temazepam (RESTORIL) 30 MG capsule [Pharmacy Med Name: Temazepam 30 MG Oral Capsule] 30 capsule 0     Sig: Take 1 capsule by mouth at bedtime         Please approve or refuse this medication.   Eliza Irving MA

## 2025-03-03 ENCOUNTER — TELEPHONE (OUTPATIENT)
Dept: PRIMARY CARE CLINIC | Age: 40
End: 2025-03-03

## 2025-03-03 NOTE — TELEPHONE ENCOUNTER
Pt needs prior authorization for the Restoril 30 mg for [facility] to be completed as soon as possible and sent to [facility].

## 2025-03-04 NOTE — TELEPHONE ENCOUNTER
Completing through cover my meds and soon as I get response from insurance company I will forward to [facility] pharmacy for patient.

## 2025-03-19 DIAGNOSIS — F41.1 GAD (GENERALIZED ANXIETY DISORDER): ICD-10-CM

## 2025-03-19 DIAGNOSIS — G47.00 INSOMNIA, UNSPECIFIED TYPE: ICD-10-CM

## 2025-03-19 RX ORDER — TEMAZEPAM 30 MG/1
CAPSULE ORAL
Qty: 30 CAPSULE | Refills: 0 | Status: SHIPPED | OUTPATIENT
Start: 2025-03-19 | End: 2025-04-14

## 2025-03-19 NOTE — TELEPHONE ENCOUNTER
Germán Perla called to request a refill on his medication.      Last office visit : 10/2/2024   Next office visit : 4/2/2025     Last UDS:   Benzodiazepine Screen, Urine   Date Value Ref Range Status   01/17/2025 positive  Final     Buprenorphine Urine   Date Value Ref Range Status   01/17/2025 negative  Final     Cocaine Metabolite Screen, Urine   Date Value Ref Range Status   01/17/2025 negative  Final     Gabapentin Screen, Urine   Date Value Ref Range Status   02/23/2023 Negative  Final     Oxycodone Screen, Ur   Date Value Ref Range Status   01/17/2025 negative  Final     Propoxyphene Screen, Urine   Date Value Ref Range Status   01/17/2025 negative  Final     THC Screen, Urine   Date Value Ref Range Status   01/17/2025 negative  Final     Tricyclic Antidepressants, Urine   Date Value Ref Range Status   01/17/2025 negative  Final       Last Torin: 03-  Medication Contract: 01-  Last Fill: 02-    Requested Prescriptions     Pending Prescriptions Disp Refills    temazepam (RESTORIL) 30 MG capsule 30 capsule 0     Sig: Take 1 capsule by mouth at bedtime         Please approve or refuse this medication.   Eliza Irving MA

## 2025-03-24 DIAGNOSIS — T78.40XA ALLERGY, INITIAL ENCOUNTER: Primary | ICD-10-CM

## 2025-04-01 DIAGNOSIS — T78.40XA ALLERGY, INITIAL ENCOUNTER: ICD-10-CM

## 2025-04-01 SDOH — ECONOMIC STABILITY: TRANSPORTATION INSECURITY
IN THE PAST 12 MONTHS, HAS THE LACK OF TRANSPORTATION KEPT YOU FROM MEDICAL APPOINTMENTS OR FROM GETTING MEDICATIONS?: NO

## 2025-04-01 SDOH — ECONOMIC STABILITY: FOOD INSECURITY: WITHIN THE PAST 12 MONTHS, YOU WORRIED THAT YOUR FOOD WOULD RUN OUT BEFORE YOU GOT MONEY TO BUY MORE.: NEVER TRUE

## 2025-04-01 SDOH — ECONOMIC STABILITY: INCOME INSECURITY: IN THE LAST 12 MONTHS, WAS THERE A TIME WHEN YOU WERE NOT ABLE TO PAY THE MORTGAGE OR RENT ON TIME?: NO

## 2025-04-01 SDOH — ECONOMIC STABILITY: FOOD INSECURITY: WITHIN THE PAST 12 MONTHS, THE FOOD YOU BOUGHT JUST DIDN'T LAST AND YOU DIDN'T HAVE MONEY TO GET MORE.: NEVER TRUE

## 2025-04-02 ENCOUNTER — OFFICE VISIT (OUTPATIENT)
Dept: PRIMARY CARE CLINIC | Age: 40
End: 2025-04-02
Payer: COMMERCIAL

## 2025-04-02 ENCOUNTER — RESULTS FOLLOW-UP (OUTPATIENT)
Dept: PRIMARY CARE CLINIC | Age: 40
End: 2025-04-02

## 2025-04-02 VITALS
WEIGHT: 243 LBS | TEMPERATURE: 97.2 F | SYSTOLIC BLOOD PRESSURE: 124 MMHG | DIASTOLIC BLOOD PRESSURE: 82 MMHG | HEART RATE: 73 BPM | BODY MASS INDEX: 32.2 KG/M2 | OXYGEN SATURATION: 97 % | HEIGHT: 73 IN

## 2025-04-02 DIAGNOSIS — G47.00 INSOMNIA, UNSPECIFIED TYPE: Primary | ICD-10-CM

## 2025-04-02 DIAGNOSIS — Z76.0 MEDICATION REFILL: ICD-10-CM

## 2025-04-02 DIAGNOSIS — F41.1 GAD (GENERALIZED ANXIETY DISORDER): ICD-10-CM

## 2025-04-02 DIAGNOSIS — J02.9 ALLERGIC PHARYNGITIS: ICD-10-CM

## 2025-04-02 LAB
ALLERGEN,FOOD, ALPHA-GAL IGE: <0.1 KU/L
BEEF IGE QN: <0.1 KU/L
DEPRECATED MISC ALLERGEN IGE RAST QL: NORMAL
LAMB IGE QN: <0.1 KU/L
PORK IGE QN: <0.1 KU/L

## 2025-04-02 PROCEDURE — 99213 OFFICE O/P EST LOW 20 MIN: CPT | Performed by: FAMILY MEDICINE

## 2025-04-02 RX ORDER — FINASTERIDE 5 MG/1
5 TABLET, FILM COATED ORAL DAILY
Qty: 30 TABLET | Refills: 0 | Status: SHIPPED | OUTPATIENT
Start: 2025-04-02

## 2025-04-02 RX ORDER — FLUTICASONE PROPIONATE 50 MCG
1 SPRAY, SUSPENSION (ML) NASAL DAILY
Qty: 16 G | Refills: 0 | Status: SHIPPED | OUTPATIENT
Start: 2025-04-02

## 2025-04-02 RX ORDER — TEMAZEPAM 30 MG/1
CAPSULE ORAL
Qty: 30 CAPSULE | Refills: 0 | Status: SHIPPED | OUTPATIENT
Start: 2025-04-02 | End: 2025-04-28

## 2025-04-02 RX ORDER — TRAZODONE HYDROCHLORIDE 50 MG/1
50 TABLET ORAL NIGHTLY PRN
Qty: 30 TABLET | Refills: 0 | Status: SHIPPED | OUTPATIENT
Start: 2025-04-02

## 2025-04-02 SDOH — ECONOMIC STABILITY: FOOD INSECURITY: WITHIN THE PAST 12 MONTHS, THE FOOD YOU BOUGHT JUST DIDN'T LAST AND YOU DIDN'T HAVE MONEY TO GET MORE.: NEVER TRUE

## 2025-04-02 SDOH — ECONOMIC STABILITY: FOOD INSECURITY: WITHIN THE PAST 12 MONTHS, YOU WORRIED THAT YOUR FOOD WOULD RUN OUT BEFORE YOU GOT MONEY TO BUY MORE.: NEVER TRUE

## 2025-04-02 ASSESSMENT — ENCOUNTER SYMPTOMS
GASTROINTESTINAL NEGATIVE: 1
COUGH: 1
EYES NEGATIVE: 1

## 2025-04-02 NOTE — PROGRESS NOTES
[facility]  [address]  [address]  [place] [address]  Dept: [phone]  Dept Fax: [phone]  Loc: [phone]      Subjective:     Chief Complaint   Patient presents with    6 Month Follow-Up    Medication Check     Insurance is not covering temazapam only 15 days at a time. Wants to talk about a new medication        HPI:  Germán Perla is a 39 y.o. male presents today for his routine follow-up visit.  He is requesting refill of his meds.   PMHx and problem list reviewed and appears to be unchanged  Chronic meds reviewed and reconciled.   Pt suffers from chronic insomnia and REBECCA - mostly brought on by stress on the job. 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 He also admits to some marital difficulties which he and his wife are trying to fix through marriage counseling and couples therapy.  He mentions mild allergy symptoms that respond well to prn use of  Zyrtec and Flonase      ROS:   Review of Systems   Constitutional: Negative.    HENT:  Positive for postnasal drip and sneezing.    Eyes: Negative.    Respiratory:  Positive for cough (dry).    Cardiovascular: Negative.    Gastrointestinal: Negative.         Occasional loose stools   Endocrine: Negative.    Genitourinary: Negative.    Musculoskeletal: Negative.    Neurological: Negative.    Psychiatric/Behavioral:  Positive for sleep disturbance (insomnia -  stable on current med). Negative for dysphoric mood. The patient is nervous/anxious.        PMHx:  Past Medical History:   Diagnosis Date    Anxiety     patient takes clonazepam as needed     Patient Active Problem List   Diagnosis    Nausea       PSHx:  Past Surgical History:   Procedure Laterality Date    COLONOSCOPY N/A 11/20/2023    Dr LAURA Traylor-AP x 1, 5 yr recall    EAR SURGERY Bilateral 1996    had ears pinned back    TONSILLECTOMY  2008    WISDOM TOOTH EXTRACTION  1998       PFHx:  Family History   Problem Relation Age of Onset    No Known

## 2025-04-28 DIAGNOSIS — G47.00 INSOMNIA, UNSPECIFIED TYPE: ICD-10-CM

## 2025-04-28 RX ORDER — TRAZODONE HYDROCHLORIDE 50 MG/1
50 TABLET ORAL NIGHTLY PRN
Qty: 30 TABLET | Refills: 0 | Status: SHIPPED | OUTPATIENT
Start: 2025-04-28 | End: 2025-04-29 | Stop reason: SDUPTHER

## 2025-04-29 ENCOUNTER — OFFICE VISIT (OUTPATIENT)
Dept: PRIMARY CARE CLINIC | Age: 40
End: 2025-04-29
Payer: COMMERCIAL

## 2025-04-29 VITALS
SYSTOLIC BLOOD PRESSURE: 128 MMHG | BODY MASS INDEX: 31.83 KG/M2 | OXYGEN SATURATION: 98 % | DIASTOLIC BLOOD PRESSURE: 82 MMHG | HEART RATE: 78 BPM | HEIGHT: 73 IN | WEIGHT: 240.2 LBS | TEMPERATURE: 97.7 F

## 2025-04-29 DIAGNOSIS — Z76.0 MEDICATION REFILL: ICD-10-CM

## 2025-04-29 DIAGNOSIS — G47.00 INSOMNIA, UNSPECIFIED TYPE: Primary | ICD-10-CM

## 2025-04-29 PROCEDURE — 99213 OFFICE O/P EST LOW 20 MIN: CPT | Performed by: FAMILY MEDICINE

## 2025-04-29 RX ORDER — TRAZODONE HYDROCHLORIDE 50 MG/1
TABLET ORAL
Qty: 45 TABLET | Refills: 0 | Status: SHIPPED | OUTPATIENT
Start: 2025-04-29

## 2025-04-29 ASSESSMENT — ENCOUNTER SYMPTOMS
EYES NEGATIVE: 1
GASTROINTESTINAL NEGATIVE: 1
RESPIRATORY NEGATIVE: 1
COUGH: 0

## 2025-04-29 NOTE — PROGRESS NOTES
[facility]  [address]  [address]  [place] [address]  Dept: [phone]  Dept Fax: [phone]  Loc: [phone]      Subjective:     Chief Complaint   Patient presents with    Follow-up     Pt presents today for follow up. No others issues reported. Provider options.        HPI:  Germán Perla is a 39 y.o. male presents today for follow-up of insomnia. He was given a trial of Trazodone at 50 mg. He also takes Melatonin, and other herbal supplements for sleep. He states he got 4 hours of sleep last night. He also admits that work can be stressful. Home life has been stressful as well but he states he and his wife are doing couples therapy.    ROS:   Review of Systems   Constitutional: Negative.    HENT: Negative.     Eyes: Negative.    Respiratory: Negative.  Negative for cough.    Cardiovascular: Negative.    Gastrointestinal: Negative.         Occasional loose stools   Endocrine: Negative.    Genitourinary: Negative.    Musculoskeletal: Negative.    Neurological: Negative.    Psychiatric/Behavioral:  Positive for sleep disturbance (insomnia -  stable on current med). Negative for dysphoric mood. The patient is nervous/anxious.        PMHx:  Past Medical History:   Diagnosis Date    Anxiety     patient takes clonazepam as needed     Patient Active Problem List   Diagnosis    Nausea       PSHx:  Past Surgical History:   Procedure Laterality Date    COLONOSCOPY N/A 11/20/2023    Dr LAURA Traylor-AP x 1, 5 yr recall    EAR SURGERY Bilateral 1996    had ears pinned back    TONSILLECTOMY  2008    WISDOM TOOTH EXTRACTION  1998       PFHx:  Family History   Problem Relation Age of Onset    No Known Problems Mother     Prostate Cancer Father     Breast Cancer Maternal Aunt         40-45       SocialHx:  Social History     Tobacco Use    Smoking status: Never    Smokeless tobacco: Never   Substance Use Topics    Alcohol use: Yes     Alcohol/week: 3.0 standard drinks of

## 2025-05-27 DIAGNOSIS — Z76.0 MEDICATION REFILL: ICD-10-CM

## 2025-05-27 DIAGNOSIS — G47.00 INSOMNIA, UNSPECIFIED TYPE: ICD-10-CM

## 2025-05-27 RX ORDER — TRAZODONE HYDROCHLORIDE 50 MG/1
50 TABLET ORAL NIGHTLY PRN
Qty: 30 TABLET | Refills: 0 | Status: SHIPPED | OUTPATIENT
Start: 2025-05-27

## 2025-05-27 NOTE — TELEPHONE ENCOUNTER
Germán Perla called to request a refill on his medication.      Last office visit : 4/29/2025   Next office visit : 6/4/2025     Requested Prescriptions     Pending Prescriptions Disp Refills    traZODone (DESYREL) 50 MG tablet [Pharmacy Med Name: traZODone HCl 50 MG Oral Tablet] 30 tablet 0     Sig: TAKE 1 TABLET BY MOUTH NIGHTLY AS NEEDED FOR SLEEP     LAST FILL WAS TAKE  1 1/2 TABLET DAILY.       Shad Garcia MA

## 2025-06-04 ENCOUNTER — OFFICE VISIT (OUTPATIENT)
Dept: PRIMARY CARE CLINIC | Age: 40
End: 2025-06-04
Payer: COMMERCIAL

## 2025-06-04 VITALS
BODY MASS INDEX: 32.02 KG/M2 | TEMPERATURE: 97.9 F | WEIGHT: 241.6 LBS | HEIGHT: 73 IN | OXYGEN SATURATION: 97 % | DIASTOLIC BLOOD PRESSURE: 78 MMHG | SYSTOLIC BLOOD PRESSURE: 128 MMHG | HEART RATE: 73 BPM

## 2025-06-04 DIAGNOSIS — F41.8 SITUATIONAL ANXIETY: ICD-10-CM

## 2025-06-04 DIAGNOSIS — G47.00 INSOMNIA, UNSPECIFIED TYPE: Primary | ICD-10-CM

## 2025-06-04 DIAGNOSIS — Z76.0 MEDICATION REFILL: ICD-10-CM

## 2025-06-04 PROCEDURE — 99213 OFFICE O/P EST LOW 20 MIN: CPT | Performed by: FAMILY MEDICINE

## 2025-06-04 RX ORDER — TRAZODONE HYDROCHLORIDE 50 MG/1
TABLET ORAL
Qty: 45 TABLET | Refills: 1 | Status: SHIPPED | OUTPATIENT
Start: 2025-06-04

## 2025-06-04 ASSESSMENT — ENCOUNTER SYMPTOMS
COUGH: 0
EYES NEGATIVE: 1
RESPIRATORY NEGATIVE: 1
GASTROINTESTINAL NEGATIVE: 1

## 2025-06-04 NOTE — PROGRESS NOTES
[facility]  [address]  [address]  [place] [address]  Dept: [phone]  Dept Fax: [phone]  Loc: [phone]      Subjective:     Chief Complaint   Patient presents with    Follow-up     1 month fu. No other issues reported        HPI:  Germán Perla is a 40 y.o. male presents today for a routine follow-up and review of his medication. He was recently given a Rx for Trazodone to take for chronic insomnia. The initial dose of 50 mg did not help much but the current dose of 75 mg worked better. He states that he is falling asleep faster and staying asleep longer. He feels refreshed in the morning.  Overall, he states he feels well. He still has anxiety issues mostly brought on by challenges at his work and some personal marital issues which he states he and his wife are working on.      ROS:   Review of Systems   Constitutional: Negative.    HENT: Negative.     Eyes: Negative.    Respiratory: Negative.  Negative for cough.    Cardiovascular: Negative.    Gastrointestinal: Negative.         Occasional loose stools   Endocrine: Negative.    Genitourinary: Negative.    Musculoskeletal: Negative.    Neurological: Negative.    Psychiatric/Behavioral:  Positive for sleep disturbance (insomnia -  stable on current med). Negative for dysphoric mood. The patient is nervous/anxious.         Situational anxiety       PMHx:  Past Medical History:   Diagnosis Date    Anxiety     patient takes clonazepam as needed     Patient Active Problem List   Diagnosis    Nausea       PSHx:  Past Surgical History:   Procedure Laterality Date    COLONOSCOPY N/A 11/20/2023    Dr LAURA Traylor-AP x 1, 5 yr recall    EAR SURGERY Bilateral 1996    had ears pinned back    TONSILLECTOMY  2008    WISDOM TOOTH EXTRACTION  1998       PFHx:  Family History   Problem Relation Age of Onset    No Known Problems Mother     Prostate Cancer Father     Breast Cancer Maternal Aunt         40-45

## 2025-06-19 DIAGNOSIS — Z76.0 MEDICATION REFILL: ICD-10-CM

## 2025-06-19 DIAGNOSIS — G47.00 INSOMNIA, UNSPECIFIED TYPE: ICD-10-CM

## 2025-06-24 RX ORDER — TRAZODONE HYDROCHLORIDE 50 MG/1
TABLET ORAL
Qty: 45 TABLET | Refills: 1 | OUTPATIENT
Start: 2025-06-24

## 2025-06-26 ENCOUNTER — PATIENT MESSAGE (OUTPATIENT)
Dept: PRIMARY CARE CLINIC | Age: 40
End: 2025-06-26

## 2025-06-26 DIAGNOSIS — Z76.0 MEDICATION REFILL: ICD-10-CM

## 2025-06-26 DIAGNOSIS — G47.00 INSOMNIA, UNSPECIFIED TYPE: ICD-10-CM

## 2025-06-26 NOTE — TELEPHONE ENCOUNTER
PT HAS  A RX AT [facility] FROM 06/04/25 AT [facility] FROM DR SHAW FOR 45 TABLETS WITH 1 REFILL

## 2025-06-27 RX ORDER — TRAZODONE HYDROCHLORIDE 50 MG/1
TABLET ORAL
Qty: 45 TABLET | Refills: 1 | OUTPATIENT
Start: 2025-06-27

## 2025-06-27 NOTE — TELEPHONE ENCOUNTER
Germán Perla called to request a refill on his medication.      Last office visit : 6/4/2025   Next office visit : 8/11/2025     Requested Prescriptions     Pending Prescriptions Disp Refills    traZODone (DESYREL) 50 MG tablet 45 tablet 1     Sig: Take 1 1/2 tablet po daily at bedtime for insomnia            Cammy Lam, RION

## 2025-08-11 ENCOUNTER — OFFICE VISIT (OUTPATIENT)
Dept: PRIMARY CARE CLINIC | Age: 40
End: 2025-08-11
Payer: COMMERCIAL

## 2025-08-11 VITALS
TEMPERATURE: 97.5 F | HEART RATE: 74 BPM | BODY MASS INDEX: 31.04 KG/M2 | HEIGHT: 73 IN | OXYGEN SATURATION: 98 % | DIASTOLIC BLOOD PRESSURE: 82 MMHG | WEIGHT: 234.2 LBS | SYSTOLIC BLOOD PRESSURE: 116 MMHG

## 2025-08-11 DIAGNOSIS — R19.5 LOOSE BOWEL MOVEMENT: ICD-10-CM

## 2025-08-11 DIAGNOSIS — G47.00 INSOMNIA, UNSPECIFIED TYPE: ICD-10-CM

## 2025-08-11 DIAGNOSIS — Z76.89 ENCOUNTER TO ESTABLISH CARE: Primary | ICD-10-CM

## 2025-08-11 DIAGNOSIS — F41.1 GAD (GENERALIZED ANXIETY DISORDER): ICD-10-CM

## 2025-08-11 DIAGNOSIS — Z13.220 SCREENING FOR HYPERLIPIDEMIA: ICD-10-CM

## 2025-08-11 PROBLEM — R11.0 NAUSEA: Status: RESOLVED | Noted: 2024-01-04 | Resolved: 2025-08-11

## 2025-08-11 PROCEDURE — 99214 OFFICE O/P EST MOD 30 MIN: CPT | Performed by: NURSE PRACTITIONER

## 2025-08-11 RX ORDER — TRAZODONE HYDROCHLORIDE 50 MG/1
TABLET ORAL
Qty: 45 TABLET | Refills: 2 | Status: SHIPPED | OUTPATIENT
Start: 2025-08-11

## 2025-08-11 RX ORDER — BUSPIRONE HYDROCHLORIDE 5 MG/1
5 TABLET ORAL 2 TIMES DAILY PRN
Qty: 30 TABLET | Refills: 0 | Status: SHIPPED | OUTPATIENT
Start: 2025-08-11 | End: 2025-09-10

## 2025-08-11 SDOH — HEALTH STABILITY: PHYSICAL HEALTH: ON AVERAGE, HOW MANY DAYS PER WEEK DO YOU ENGAGE IN MODERATE TO STRENUOUS EXERCISE (LIKE A BRISK WALK)?: 2 DAYS

## 2025-08-11 SDOH — HEALTH STABILITY: PHYSICAL HEALTH: ON AVERAGE, HOW MANY MINUTES DO YOU ENGAGE IN EXERCISE AT THIS LEVEL?: 40 MIN

## 2025-08-11 ASSESSMENT — ENCOUNTER SYMPTOMS
CHEST TIGHTNESS: 0
VOMITING: 0
DIARRHEA: 0
NAUSEA: 0
SHORTNESS OF BREATH: 0
SORE THROAT: 0
COUGH: 0
ABDOMINAL PAIN: 0
COLOR CHANGE: 0

## (undated) DEVICE — COLON KIT WITH 1.1 OZ ORCA HYDRA SEAL 2 GOWN

## (undated) DEVICE — BRUSH ENDOSCP 2 END CHN HEDGEHOG

## (undated) DEVICE — SPONGE ENDOSCP CLN BS INF PREVENTION KOALA

## (undated) DEVICE — ADAPTER CLEANING PORPOISE CLEANING

## (undated) DEVICE — SNARE ENDOSCP AD L240CM LOOP W10MM SHTH DIA2.4MM RND INSUL

## (undated) DEVICE — TRAP,MUCUS SPECIMEN,40CC: Brand: MEDLINE

## (undated) DEVICE — CANNULA NSL AD L7FT DIV O2 CO2 W/ M LUERLOCK TRMPT CONN

## (undated) DEVICE — SINGLE PORT MANIFOLD: Brand: NEPTUNE 2